# Patient Record
Sex: MALE | Employment: FULL TIME | URBAN - METROPOLITAN AREA
[De-identification: names, ages, dates, MRNs, and addresses within clinical notes are randomized per-mention and may not be internally consistent; named-entity substitution may affect disease eponyms.]

---

## 2019-05-30 ENCOUNTER — OFFICE VISIT (OUTPATIENT)
Dept: URGENT CARE | Facility: URGENT CARE | Age: 49
End: 2019-05-30
Payer: COMMERCIAL

## 2019-05-30 VITALS
HEART RATE: 73 BPM | SYSTOLIC BLOOD PRESSURE: 166 MMHG | DIASTOLIC BLOOD PRESSURE: 81 MMHG | TEMPERATURE: 97.7 F | WEIGHT: 211.6 LBS | OXYGEN SATURATION: 97 %

## 2019-05-30 DIAGNOSIS — D36.7 CYST, DERMOID, ARM, LEFT: Primary | ICD-10-CM

## 2019-05-30 PROCEDURE — 99202 OFFICE O/P NEW SF 15 MIN: CPT | Performed by: PHYSICIAN ASSISTANT

## 2019-05-30 ASSESSMENT — ENCOUNTER SYMPTOMS
CONSTITUTIONAL NEGATIVE: 1
CARDIOVASCULAR NEGATIVE: 1
PALPITATIONS: 0
NEUROLOGICAL NEGATIVE: 1
LIGHT-HEADEDNESS: 0
FEVER: 0
WEAKNESS: 0
CHEST TIGHTNESS: 0
ENDOCRINE NEGATIVE: 1
RHINORRHEA: 0
WHEEZING: 0
MYALGIAS: 0
COUGH: 0
EYE REDNESS: 0
DIAPHORESIS: 0
DYSURIA: 0
DIZZINESS: 0
NAUSEA: 0
MUSCULOSKELETAL NEGATIVE: 1
HEMATURIA: 0
ABDOMINAL PAIN: 0
ADENOPATHY: 0
FREQUENCY: 0
EYE DISCHARGE: 0
POLYDIPSIA: 0
EYE ITCHING: 0
SHORTNESS OF BREATH: 0
VOMITING: 0
GASTROINTESTINAL NEGATIVE: 1
HEADACHES: 0
CHILLS: 0
DIARRHEA: 0
EYES NEGATIVE: 1
RESPIRATORY NEGATIVE: 1
SORE THROAT: 0

## 2019-05-30 NOTE — PROGRESS NOTES
Chief Complaint:    Chief Complaint   Patient presents with     Mass     Patient complains of lump on left bicup        HPI: Wilber Hamm is an 48 year old male who presents for evaluation and treatment of lump on his L bicep.  Patient noticed this yesterday.  The lump has not gotten larger.  It is not painful.  He has not had any injury to the area.      Patient is new to [place].    ROS:      Review of Systems   Constitutional: Negative.  Negative for chills, diaphoresis and fever.   HENT: Negative.  Negative for congestion, ear pain, rhinorrhea and sore throat.    Eyes: Negative.  Negative for discharge, redness and itching.   Respiratory: Negative.  Negative for cough, chest tightness, shortness of breath and wheezing.    Cardiovascular: Negative.  Negative for chest pain and palpitations.   Gastrointestinal: Negative.  Negative for abdominal pain, diarrhea, nausea and vomiting.   Endocrine: Negative.  Negative for polydipsia and polyuria.   Genitourinary: Negative for dysuria, frequency, hematuria and urgency.   Musculoskeletal: Negative.  Negative for myalgias.   Skin: Negative for rash.        Pos for lump on L bicep   Allergic/Immunologic: Negative for immunocompromised state.   Neurological: Negative.  Negative for dizziness, weakness, light-headedness and headaches.   Hematological: Negative for adenopathy.        No pertinent family or medical Hx at this time.  Patient has never smoked.  No pertinent surgical Hx at this time.    Family History   No family history on file.    Social History  Social History     Socioeconomic History     Marital status: Single     Spouse name: Not on file     Number of children: Not on file     Years of education: Not on file     Highest education level: Not on file   Occupational History     Not on file   Social Needs     Financial resource strain: Not on file     Food insecurity:     Worry: Not on file     Inability: Not on file     Transportation needs:     Medical: Not  on file     Non-medical: Not on file   Tobacco Use     Smoking status: Never Smoker     Smokeless tobacco: Never Used   Substance and Sexual Activity     Alcohol use: Not on file     Drug use: Not on file     Sexual activity: Not on file   Lifestyle     Physical activity:     Days per week: Not on file     Minutes per session: Not on file     Stress: Not on file   Relationships     Social connections:     Talks on phone: Not on file     Gets together: Not on file     Attends Jain service: Not on file     Active member of club or organization: Not on file     Attends meetings of clubs or organizations: Not on file     Relationship status: Not on file     Intimate partner violence:     Fear of current or ex partner: Not on file     Emotionally abused: Not on file     Physically abused: Not on file     Forced sexual activity: Not on file   Other Topics Concern     Not on file   Social History Narrative     Not on file        Surgical History:  No past surgical history on file.     Problem List:  There is no problem list on file for this patient.       Allergies:  No Known Allergies     Current Meds:  No current outpatient medications on file.     PHYSICAL EXAM:     Vital signs noted and reviewed by Aldair Mcgrath  /81 (BP Location: Left arm, Patient Position: Chair, Cuff Size: Adult Regular)   Pulse 73   Temp 97.7  F (36.5  C) (Oral)   Wt 96 kg (211 lb 9.6 oz)   SpO2 97%      PEFR:    Physical Exam   Constitutional: He appears well-developed and well-nourished. He is cooperative.  Non-toxic appearance. He does not have a sickly appearance. He does not appear ill. No distress.   HENT:   Head: Normocephalic and atraumatic.   Right Ear: Hearing, tympanic membrane, external ear and ear canal normal. Tympanic membrane is not perforated, not erythematous, not retracted and not bulging.   Left Ear: Hearing, tympanic membrane, external ear and ear canal normal. Tympanic membrane is not perforated, not  erythematous, not retracted and not bulging.   Nose: Nose normal. No mucosal edema or rhinorrhea.   Mouth/Throat: Oropharynx is clear and moist and mucous membranes are normal. No oropharyngeal exudate, posterior oropharyngeal edema, posterior oropharyngeal erythema or tonsillar abscesses. Tonsils are 0 on the right. Tonsils are 0 on the left. No tonsillar exudate.   Eyes: Pupils are equal, round, and reactive to light. EOM are normal.   Neck: Normal range of motion. Neck supple.   Cardiovascular: Normal rate, regular rhythm, S1 normal, S2 normal, normal heart sounds and intact distal pulses. Exam reveals no gallop, no distant heart sounds and no friction rub.   No murmur heard.  Pulmonary/Chest: Effort normal and breath sounds normal. No respiratory distress. He has no decreased breath sounds. He has no wheezes. He has no rhonchi. He has no rales.   Abdominal: Soft. Bowel sounds are normal. He exhibits no distension. There is no tenderness.   Lymphadenopathy:     He has no cervical adenopathy.   Neurological: He is alert. No cranial nerve deficit.   Skin: Skin is warm and dry. No rash noted. He is not diaphoretic.        Roughly pea sized moveable area of firmness on the L bicep.  No swelling, or discoloration.   Psychiatric: He has a normal mood and affect. His speech is normal and behavior is normal. Judgment and thought content normal. Cognition and memory are normal. He is attentive.   Nursing note and vitals reviewed.       Labs:     No results found for this or any previous visit.    Medical Decision Making:    Differential Diagnosis:  Cyst, foreign body     ASSESSMENT:     1. Cyst, dermoid, arm, left           PLAN:     Patient instructed to continue to monitor this.  He will follow up with his PCP if any changes occur.  Worrisome symptoms discussed with instructions to go to the ED.  Patient verbalized understanding and agreed with this plan.     Aldair Mcgrath  5/30/2019, 11:43 AM

## 2020-04-18 ENCOUNTER — VIRTUAL VISIT (OUTPATIENT)
Dept: URGENT CARE | Facility: CLINIC | Age: 50
End: 2020-04-18
Payer: COMMERCIAL

## 2020-04-18 ENCOUNTER — NURSE TRIAGE (OUTPATIENT)
Dept: NURSING | Facility: CLINIC | Age: 50
End: 2020-04-18

## 2020-04-18 DIAGNOSIS — R10.13 DYSPEPSIA: Primary | ICD-10-CM

## 2020-04-18 PROCEDURE — 99213 OFFICE O/P EST LOW 20 MIN: CPT | Mod: 95 | Performed by: INTERNAL MEDICINE

## 2020-04-18 RX ORDER — HYDROXYZINE HYDROCHLORIDE 25 MG/1
25 TABLET, FILM COATED ORAL 3 TIMES DAILY PRN
COMMUNITY
End: 2020-07-14

## 2020-04-18 NOTE — PATIENT INSTRUCTIONS
Sg Merino,     Thanks for speaking with us today. We hope you feel better soon. As discussed, these are the things you should do following the visit:      Use Pepto Bismol as needed for your symptoms    Eat bland foods for the next few days    Keep well hydrated    If the above measures don't work, consider buying other over the counter medications from a drugstore for stomach discomfort, such as famotidine    If your symptoms haven't improved by middle of next week, seek in-person care at an urgent care    We would advise you to set up care with a primary care provider once we are able to go back to in person visits.    Thank you,    Kathrin  Medical Student

## 2020-04-18 NOTE — PROGRESS NOTES
"Wilber Hamm is a 49 year old male who is being evaluated via a billable telephone visit.      The patient has been notified of following:     \"This telephone visit will be conducted via a call between you and your physician/provider. We have found that certain health care needs can be provided without the need for a physical exam.  This service lets us provide the care you need with a short phone conversation.  If a prescription is necessary we can send it directly to your pharmacy.  If lab work is needed we can place an order for that and you can then stop by our lab to have the test done at a later time.    Telephone visits are billed at different rates depending on your insurance coverage. During this emergency period, for some insurers they may be billed the same as an in-person visit.  Please reach out to your insurance provider with any questions.    If during the course of the call the physician/provider feels a telephone visit is not appropriate, you will not be charged for this service.\"    Patient has given verbal consent for Telephone visit?  Yes    How would you like to obtain your AVS? Cornelius Prajapati     Wilber Hamm is a 49 year old male who presents to clinic today for the following health issues:  \"abdominal pain\"     On Wednesday and Thursday, he started feeling more run down, and possibly febrile (no thermometer). He went for a jog last night and was feeling better, \"back to himself.\" He woke up this morning at 5:30am with a knot in his stomach, \"like it was turning from eating something bad.\" It has never been sharply painful, more uncomfortable. At its worst, it was a 5/10. It is not constant. It comes and goes every 30-45 minutes and lasts for a few seconds. The pain is located in the upper stomach.  He took Pepto Bismol which helped him slightly, but not much. After a nap, it felt a little better. He's feeling a bit better this afternoon. Nothing has made the pain worse. He is burping a " lot with a bad taste. He has slight nausea. He has not vomited. He has had no diarrhea. He last had a bowel movement that was normal a couple hours ago. He urinated every 40 minutes this morning. He felt like his mouth was a little dry this morning. He started doing ab workouts in the past few weeks. In the same timeline, he thinks his stomach sounds have been louder. He hasn't had a change in diet. He had Chipotle for dinner last night. He has not had Chipotle in a few months. He had a cold in late February/early March for a few weeks with lots of coughing.     10-12 years ago he had gastritis, which felt similar. He thinks he was given an antibiotic.     No family history of GI issues.    He works in sales for [employer].    He takes hydroxyzine for anxiety. The last time he took it was 2 months ago.    He does not think he has had any exposure to a suspected COVID-19 patient.    He is not coughing, has no runny nose, no headache, no change to temperature perception, no muscle aches, no anosmia, no change to mucus.       Reviewed and updated as needed this visit by Provider         Review of Systems   ROS COMP: Constitutional, HEENT, cardiovascular, pulmonary, gi and gu systems are negative, except as otherwise noted.       Objective   Reported vitals:  There were no vitals taken for this visit.   healthy, alert and no distress  PSYCH: Alert and oriented times 3; coherent speech, normal   rate and volume, able to articulate logical thoughts, able   to abstract reason, no tangential thoughts, no hallucinations   or delusions  His affect is normal  RESP: No cough, no audible wheezing, able to talk in full sentences  Remainder of exam unable to be completed due to telephone visits    Diagnostic Test Results:  Labs reviewed in Epic        Assessment/Plan:  1. Dyspepsia  Likely acute gastritis, possibly viral. Other possibilities include biliary colic, peptic ulcer disease, lactose intolerance. Possibly precipitated by  large meal of Chipotle last night.  Will treat with supportive measures such as Pepto Bismol, bland diet, hydration, OTC medications as necessary (I.e., famotidine).   Discussed following up in person if symptoms haven't resolved by middle of the upcoming week.  Discussed primary care should be established.      No follow-ups on file.      Phone call duration:  43 minutes    Kathrin Oconnor, MS3

## 2020-04-18 NOTE — TELEPHONE ENCOUNTER
"Patient telephone [phone]    Patient states has had mid upper abdominal pain \"2 inches above navel\" x 4.5 hours. Woke up with 7/10 constant pain.  States now the pain comes and goes with a baseline pain 2/10 and then exacerbates periodically.  Slight relief after trying Pepto bismol 2 hours ago.  States 3 days ago had low grade fever and increased fatigue.   Improved by report until early this morning.  No cough. No shortness of breath.  History of gastritis 10 years ago.    Currently afebrile.   Has slight nausea. No vomiting.   States had bowel movement as usual today.  Voiding as usual.    Protocol-  Abdominal Pain-A  Care advice reviewed.   Disposition-  See below per [id number]  Transferred to  for virtual Urgent Care visit today.  Caller states understanding of the recommended disposition.   Advised to call back if further questions or concerns.     BRITNI EvansN RN  [facility]     COVID 19 Nurse Triage Plan/Patient Instructions    Please be aware that novel coronavirus (COVID-19) may be circulating in the community. If you develop symptoms such as fever, cough, or SOB or if you have concerns about the presence of another infection including coronavirus (COVID-19), please contact your health care provider or visit [email or web address].     Disposition/Instructions  Patient to have an Urgent Care Telephone Visit with a provider. Follow System Ambulatory Workflow for COVID 19.     Urgent Care Telephone Visits are available between the hours of 8 am to 9 pm. Staff will assist patent in scheduling an appointment for this Urgent Care Telephone Visit.     Call Back If: Your symptoms worsen before you are able to complete your Urgent Care Telephone Visit with a provider.    Thank you for limiting contact with others, wearing a simple mask to cover your cough, practice good hand hygiene habits and accessing our virtual services where possible to limit the spread of this " "virus.    For more information about COVID19 and options for caring for yourself at home, please visit the CDC website at https://www.cdc.gov/coronavirus/2019-ncov/about/steps-when-sick.html  For more options for care at [facility], please visit our website at [email or web address]    For more information, please use the [place] Department of Health ([facility]) COVID-19 Hotlines (Interpreters available):     Health questions: Phone Number: [phone] or [phone] and Hours: 7 a.m. to 7 p.m.    Schools and  questions: Phone Number: [phone] or [phone] and Hours 7 a.m. to 7 p.m.  Reason for Disposition    [1] MILD-MODERATE pain AND [2] constant AND [3] present > 2 hours    Additional Information    Negative: Shock suspected (e.g., cold/pale/clammy skin, too weak to stand, low BP, rapid pulse)    Negative: Difficult to awaken or acting confused (e.g., disoriented, slurred speech)    Negative: Passed out (i.e., lost consciousness, collapsed and was not responding)    Negative: Sounds like a life-threatening emergency to the triager    Negative: [1] SEVERE pain (e.g., excruciating) AND [2] present > 1 hour    Negative: [1] SEVERE pain AND [2] age > 60    Negative: [1] Vomiting AND [2] contains red blood or black (\"coffee ground\") material  (Exception: few red streaks in vomit that only happened once)    Negative: Blood in bowel movements  (Exception: Blood on surface of BM with constipation)    Negative: Black or tarry bowel movements  (Exception: chronic-unchanged  black-grey bowel movements AND is taking iron pills or Pepto-bismol)    Negative: [1] Unable to urinate (or only a few drops) > 4 hours AND [2] bladder feels very full (e.g., palpable bladder or strong urge to urinate)    Negative: [1] Pain in the scrotum or testicle AND [2] present > 1 hour    Negative: Patient sounds very sick or weak to the triager    Protocols used: ABDOMINAL PAIN - MALE-A-AH    "

## 2020-04-19 ENCOUNTER — OFFICE VISIT (OUTPATIENT)
Dept: URGENT CARE | Facility: URGENT CARE | Age: 50
End: 2020-04-19
Payer: COMMERCIAL

## 2020-04-19 VITALS
WEIGHT: 208.2 LBS | TEMPERATURE: 98.5 F | HEART RATE: 83 BPM | DIASTOLIC BLOOD PRESSURE: 91 MMHG | SYSTOLIC BLOOD PRESSURE: 149 MMHG | OXYGEN SATURATION: 98 %

## 2020-04-19 DIAGNOSIS — R10.13 EPIGASTRIC PAIN: ICD-10-CM

## 2020-04-19 DIAGNOSIS — R10.13 DYSPEPSIA: Primary | ICD-10-CM

## 2020-04-19 LAB
ALBUMIN UR-MCNC: NEGATIVE MG/DL
APPEARANCE UR: CLEAR
BASOPHILS # BLD AUTO: 0 10E9/L (ref 0–0.2)
BASOPHILS NFR BLD AUTO: 0.1 %
BILIRUB UR QL STRIP: NEGATIVE
COLOR UR AUTO: YELLOW
DIFFERENTIAL METHOD BLD: NORMAL
EOSINOPHIL # BLD AUTO: 0.1 10E9/L (ref 0–0.7)
EOSINOPHIL NFR BLD AUTO: 1.1 %
ERYTHROCYTE [DISTWIDTH] IN BLOOD BY AUTOMATED COUNT: 11.9 % (ref 10–15)
GLUCOSE UR STRIP-MCNC: NEGATIVE MG/DL
HCT VFR BLD AUTO: 44.8 % (ref 40–53)
HGB BLD-MCNC: 15.1 G/DL (ref 13.3–17.7)
HGB UR QL STRIP: NEGATIVE
KETONES UR STRIP-MCNC: NEGATIVE MG/DL
LEUKOCYTE ESTERASE UR QL STRIP: NEGATIVE
LYMPHOCYTES # BLD AUTO: 1.2 10E9/L (ref 0.8–5.3)
LYMPHOCYTES NFR BLD AUTO: 16.4 %
MCH RBC QN AUTO: 29.7 PG (ref 26.5–33)
MCHC RBC AUTO-ENTMCNC: 33.7 G/DL (ref 31.5–36.5)
MCV RBC AUTO: 88 FL (ref 78–100)
MONOCYTES # BLD AUTO: 0.6 10E9/L (ref 0–1.3)
MONOCYTES NFR BLD AUTO: 9.1 %
NEUTROPHILS # BLD AUTO: 5.1 10E9/L (ref 1.6–8.3)
NEUTROPHILS NFR BLD AUTO: 73.3 %
NITRATE UR QL: NEGATIVE
PH UR STRIP: 5.5 PH (ref 5–7)
PLATELET # BLD AUTO: 224 10E9/L (ref 150–450)
RBC # BLD AUTO: 5.09 10E12/L (ref 4.4–5.9)
SOURCE: NORMAL
SP GR UR STRIP: >1.03 (ref 1–1.03)
UROBILINOGEN UR STRIP-ACNC: 0.2 EU/DL (ref 0.2–1)
WBC # BLD AUTO: 7 10E9/L (ref 4–11)

## 2020-04-19 PROCEDURE — 85025 COMPLETE CBC W/AUTO DIFF WBC: CPT | Performed by: PHYSICIAN ASSISTANT

## 2020-04-19 PROCEDURE — 81003 URINALYSIS AUTO W/O SCOPE: CPT | Performed by: PHYSICIAN ASSISTANT

## 2020-04-19 PROCEDURE — 36415 COLL VENOUS BLD VENIPUNCTURE: CPT | Performed by: PHYSICIAN ASSISTANT

## 2020-04-19 PROCEDURE — 80053 COMPREHEN METABOLIC PANEL: CPT | Performed by: PHYSICIAN ASSISTANT

## 2020-04-19 PROCEDURE — 83690 ASSAY OF LIPASE: CPT | Performed by: PHYSICIAN ASSISTANT

## 2020-04-19 PROCEDURE — 99214 OFFICE O/P EST MOD 30 MIN: CPT | Performed by: PHYSICIAN ASSISTANT

## 2020-04-19 RX ORDER — OMEPRAZOLE 20 MG/1
20 TABLET, DELAYED RELEASE ORAL DAILY
Qty: 30 TABLET | Refills: 0 | Status: SHIPPED | OUTPATIENT
Start: 2020-04-19 | End: 2020-05-11

## 2020-04-19 NOTE — LETTER
April 20, 2020      Wilber Hamm  [address] [place] [address]  [place] [address]      Dear ,    The results of your recent lab tests were within normal limits. Enclosed is a copy of these results.  If you have any further questions or problems, please contact our office at [phone].    Sincerely,      Heather Delacruz PA-C    Resulted Orders   *UA reflex to Microscopic and Culture ([place] and [facility] (except [place] and [place])   Result Value Ref Range    Color Urine Yellow     Appearance Urine Clear     Glucose Urine Negative NEG^Negative mg/dL    Bilirubin Urine Negative NEG^Negative    Ketones Urine Negative NEG^Negative mg/dL    Specific Gravity Urine >1.030 1.003 - 1.035    Blood Urine Negative NEG^Negative    pH Urine 5.5 5.0 - 7.0 pH    Protein Albumin Urine Negative NEG^Negative mg/dL    Urobilinogen Urine 0.2 0.2 - 1.0 EU/dL    Nitrite Urine Negative NEG^Negative    Leukocyte Esterase Urine Negative NEG^Negative    Source Midstream Urine    CBC with platelets and differential   Result Value Ref Range    WBC 7.0 4.0 - 11.0 10e9/L    RBC Count 5.09 4.4 - 5.9 10e12/L    Hemoglobin 15.1 13.3 - 17.7 g/dL    Hematocrit 44.8 40.0 - 53.0 %    MCV 88 78 - 100 fl    MCH 29.7 26.5 - 33.0 pg    MCHC 33.7 31.5 - 36.5 g/dL    RDW 11.9 10.0 - 15.0 %    Platelet Count 224 150 - 450 10e9/L    % Neutrophils 73.3 %    % Lymphocytes 16.4 %    % Monocytes 9.1 %    % Eosinophils 1.1 %    % Basophils 0.1 %    Absolute Neutrophil 5.1 1.6 - 8.3 10e9/L    Absolute Lymphocytes 1.2 0.8 - 5.3 10e9/L    Absolute Monocytes 0.6 0.0 - 1.3 10e9/L    Absolute Eosinophils 0.1 0.0 - 0.7 10e9/L    Absolute Basophils 0.0 0.0 - 0.2 10e9/L    Diff Method Automated Method    Comprehensive metabolic panel (BMP + Alb, Alk Phos, ALT, AST, Total. Bili, TP)   Result Value Ref Range    Sodium 138 133 - 144 mmol/L    Potassium 4.2 3.4 - 5.3 mmol/L    Chloride 105 94 - 109 mmol/L    Carbon Dioxide 29 20 - 32 mmol/L    Anion Gap  4 3 - 14 mmol/L    Glucose 84 70 - 99 mg/dL      Comment:      Non Fasting    Urea Nitrogen 17 7 - 30 mg/dL    Creatinine 0.92 0.66 - 1.25 mg/dL    GFR Estimate >90 >60 mL/min/[1.73_m2]      Comment:      Non  GFR Calc  Starting 12/18/2018, serum creatinine based estimated GFR (eGFR) will be   calculated using the Chronic Kidney Disease Epidemiology Collaboration   (CKD-EPI) equation.      GFR Estimate If Black >90 >60 mL/min/[1.73_m2]      Comment:       GFR Calc  Starting 12/18/2018, serum creatinine based estimated GFR (eGFR) will be   calculated using the Chronic Kidney Disease Epidemiology Collaboration   (CKD-EPI) equation.      Calcium 9.4 8.5 - 10.1 mg/dL    Bilirubin Total 0.7 0.2 - 1.3 mg/dL    Albumin 3.9 3.4 - 5.0 g/dL    Protein Total 7.4 6.8 - 8.8 g/dL    Alkaline Phosphatase 74 40 - 150 U/L    ALT 21 0 - 70 U/L    AST 20 0 - 45 U/L   Lipase   Result Value Ref Range    Lipase 77 73 - 393 U/L

## 2020-04-19 NOTE — PROGRESS NOTES
"S: 48 yo male presents for  epigastric pain x 2 days. Had Chipotle delivered the night before.  Run down feeling.  However has noted increased abdominal gurgling sounds for 2 weeks. Nauseated. No dizziness. Pain not worse after eating. Yesterday had urinary frequency, none now. Some left sided thoracic discomfort. Pepto Bismol, minimal relief. No HA or myalgia. No CP or SHORTNESS OF BREATH. No vomiting or diarrhea. Burps have an acid taste to them.      As per virtual UC visit yesterday:    On Wednesday, 4/15 and Thursday, 4/16 he started feeling more run down, and possibly febrile (no thermometer). He went for a jog night of 4/17 and was feeling better, \"back to himself.\" He woke up this morning at 5:30am with a knot in his stomach, \"like it was turning from eating something bad.\" It has never been sharply painful, more uncomfortable. At its worst, it was a 5/10. It is not constant. It comes and goes every 30-45 minutes and lasts for a few seconds. The pain is located in the upper stomach.  He took Pepto Bismol which helped him slightly, but not much. After a nap, it felt a little better. He's feeling a bit better this afternoon. Nothing has made the pain worse. He is burping a lot with a bad taste. He has slight nausea. He has not vomited. He has had no diarrhea. He last had a bowel movement that was normal a couple hours ago. He urinated every 40 minutes 4/18 in the morning. He felt like his mouth was a little dry this morning. He started doing ab workouts in the past few weeks. In the same timeline, he thinks his stomach sounds have been louder. He hasn't had a change in diet. He had Chipotle for dinner last night. He has not had Chipotle in a few months. He had a cold in late February/early March for a few weeks with lots of coughing.       No Known Allergies    PMHX-H/O urethritis. Neg GC/CHlamydia 12/21/2018  FMHX- no gallstones    hydrOXYzine (ATARAX) 25 MG tablet, Take 25 mg by mouth 3 times daily as " needed    No current facility-administered medications on file prior to visit.       Social History     Tobacco Use     Smoking status: Never Smoker     Smokeless tobacco: Never Used   Substance Use Topics     Alcohol use: Not on file   No alcohol intake    ROS:  CONSTITUTIONAL: Negative for fatigue or fever.  EYES: Negative for eye problems.  ENT: neg for ST.  RESP: neg for cough.  CV: Negative for chest pains.  GI: Negative for vomiting.  MUSCULOSKELETAL:  Negative for significant muscle or joint pains.  NEUROLOGIC: Negative for headaches.  SKIN: Negative for rash.  PSYCH: Normal mentation for age.    OBJECTIVE:  BP (!) 149/91 (BP Location: Left arm, Patient Position: Chair, Cuff Size: Adult Regular)   Pulse 83   Temp 98.5  F (36.9  C) (Oral)   Wt 94.4 kg (208 lb 3.2 oz)   SpO2 98%     GENERAL APPEARANCE: Healthy, alert and no distress.  EYES:Conjunctiva/sclera clear.  EARS: No cerumen.   Ear canals w/o erythema.  TM's intact w/o erythema.    NOSE/MOUTH: Nose without ulcers, erythema or lesions.  SINUSES: No maxillary sinus tenderness.  THROAT: No erythema w/o tonsillar enlargement . No exudates.  NECK: Supple, nontender, no lymphadenopathy.  RESP: Lungs clear to auscultation - no rales, rhonchi or wheezes  CV: Regular rate and rhythm, normal S1 S2, no murmur noted.  NEURO: Awake, alert    SKIN: No rashes  Abd- soft , mild epigastric tenderness. Mildly hyperactive bowel sounds. No HSM. No rigidity, guarding or rebound.  Results for orders placed or performed in visit on 04/19/20   *UA reflex to Microscopic and Culture ([facility] (except [place] and [place])     Status: None    Specimen: Midstream Urine   Result Value Ref Range    Color Urine Yellow     Appearance Urine Clear     Glucose Urine Negative NEG^Negative mg/dL    Bilirubin Urine Negative NEG^Negative    Ketones Urine Negative NEG^Negative mg/dL    Specific Gravity Urine >1.030 1.003 - 1.035    Blood Urine Negative NEG^Negative     pH Urine 5.5 5.0 - 7.0 pH    Protein Albumin Urine Negative NEG^Negative mg/dL    Urobilinogen Urine 0.2 0.2 - 1.0 EU/dL    Nitrite Urine Negative NEG^Negative    Leukocyte Esterase Urine Negative NEG^Negative    Source Midstream Urine    CBC with platelets and differential     Status: None   Result Value Ref Range    WBC 7.0 4.0 - 11.0 10e9/L    RBC Count 5.09 4.4 - 5.9 10e12/L    Hemoglobin 15.1 13.3 - 17.7 g/dL    Hematocrit 44.8 40.0 - 53.0 %    MCV 88 78 - 100 fl    MCH 29.7 26.5 - 33.0 pg    MCHC 33.7 31.5 - 36.5 g/dL    RDW 11.9 10.0 - 15.0 %    Platelet Count 224 150 - 450 10e9/L    % Neutrophils 73.3 %    % Lymphocytes 16.4 %    % Monocytes 9.1 %    % Eosinophils 1.1 %    % Basophils 0.1 %    Absolute Neutrophil 5.1 1.6 - 8.3 10e9/L    Absolute Lymphocytes 1.2 0.8 - 5.3 10e9/L    Absolute Monocytes 0.6 0.0 - 1.3 10e9/L    Absolute Eosinophils 0.1 0.0 - 0.7 10e9/L    Absolute Basophils 0.0 0.0 - 0.2 10e9/L    Diff Method Automated Method        ASSESSMENT:     ICD-10-CM    1. Dyspepsia  R10.13 *UA reflex to Microscopic and Culture ([facility] [place] (except [place] and [place])     CBC with platelets and differential     Comprehensive metabolic panel (BMP + Alb, Alk Phos, ALT, AST, Total. Bili, TP)     Lipase     lidocaine (XYLOCAINE) 2 % 15 mL, alum & mag hydroxide-simethicone (MAALOX  ES) 15 mL GI Cocktail     omeprazole (PRILOSEC OTC) 20 MG EC tablet   2. Epigastric pain  R10.13 *UA reflex to Microscopic and Culture ([facility] [place] (except [place] and [place])     CBC with platelets and differential     Comprehensive metabolic panel (BMP + Alb, Alk Phos, ALT, AST, Total. Bili, TP)     Lipase     lidocaine (XYLOCAINE) 2 % 15 mL, alum & mag hydroxide-simethicone (MAALOX  ES) 15 mL GI Cocktail     omeprazole (PRILOSEC OTC) 20 MG EC tablet           PLAN:Can't say yet if GI cocktail helped- only 20 minutes. Sxs likely due to gastritis or GERD. Start Prilosec. F/U PCP 1 week prn.  Sooner as needed.  I have discussed clinical findings with patient.  Side effects of medications discussed.  Symptomatic care is discussed.  I have discussed the possibility of  worsening symptoms and indication to RTC or go to the ER if they occur.  All questions are answered, patient indicates understanding of these issues and is in agreement with plan.   Patient care instructions are discussed/given at the end of visit.   Lots of rest and fluids.    Heather Delacruz PA-C

## 2020-04-20 LAB
ALBUMIN SERPL-MCNC: 3.9 G/DL (ref 3.4–5)
ALP SERPL-CCNC: 74 U/L (ref 40–150)
ALT SERPL W P-5'-P-CCNC: 21 U/L (ref 0–70)
ANION GAP SERPL CALCULATED.3IONS-SCNC: 4 MMOL/L (ref 3–14)
AST SERPL W P-5'-P-CCNC: 20 U/L (ref 0–45)
BILIRUB SERPL-MCNC: 0.7 MG/DL (ref 0.2–1.3)
BUN SERPL-MCNC: 17 MG/DL (ref 7–30)
CALCIUM SERPL-MCNC: 9.4 MG/DL (ref 8.5–10.1)
CHLORIDE SERPL-SCNC: 105 MMOL/L (ref 94–109)
CO2 SERPL-SCNC: 29 MMOL/L (ref 20–32)
CREAT SERPL-MCNC: 0.92 MG/DL (ref 0.66–1.25)
GFR SERPL CREATININE-BSD FRML MDRD: >90 ML/MIN/{1.73_M2}
GLUCOSE SERPL-MCNC: 84 MG/DL (ref 70–99)
LIPASE SERPL-CCNC: 77 U/L (ref 73–393)
POTASSIUM SERPL-SCNC: 4.2 MMOL/L (ref 3.4–5.3)
PROT SERPL-MCNC: 7.4 G/DL (ref 6.8–8.8)
SODIUM SERPL-SCNC: 138 MMOL/L (ref 133–144)

## 2020-04-25 NOTE — PROGRESS NOTES
I was present during this telephone visit with the medical student who participated in the service and in the documentation of the note. I have verified the history and personally performed the physical exam and medical decision making. I agree with the assessment and plan of care as documented in the note with the following additions:       I personally spent a total of 10 minutes speaking with Wilber Hamm during today s visit.     Dave Castro MD  9:22 AM, April 25, 2020

## 2020-05-11 ENCOUNTER — VIRTUAL VISIT (OUTPATIENT)
Dept: FAMILY MEDICINE | Facility: CLINIC | Age: 50
End: 2020-05-11
Payer: COMMERCIAL

## 2020-05-11 DIAGNOSIS — K29.00 ACUTE GASTRITIS WITHOUT HEMORRHAGE, UNSPECIFIED GASTRITIS TYPE: ICD-10-CM

## 2020-05-11 DIAGNOSIS — R10.13 EPIGASTRIC PAIN: Primary | ICD-10-CM

## 2020-05-11 PROCEDURE — 99214 OFFICE O/P EST MOD 30 MIN: CPT | Mod: 95 | Performed by: PHYSICIAN ASSISTANT

## 2020-05-11 ASSESSMENT — PAIN SCALES - GENERAL: PAINLEVEL: MILD PAIN (2)

## 2020-05-11 NOTE — PROGRESS NOTES
Patient needs to schedule lab only appointment.    Please call patient with instructions     Thank you   Sonja REED

## 2020-05-11 NOTE — PROGRESS NOTES
"Wilber Hamm is a 49 year old male who is being evaluated via a billable telephone visit.      The patient has been notified of following:     \"This telephone visit will be conducted via a call between you and your physician/provider. We have found that certain health care needs can be provided without the need for a physical exam.  This service lets us provide the care you need with a short phone conversation.  If a prescription is necessary we can send it directly to your pharmacy.  If lab work is needed we can place an order for that and you can then stop by our lab to have the test done at a later time.    Telephone visits are billed at different rates depending on your insurance coverage. During this emergency period, for some insurers they may be billed the same as an in-person visit.  Please reach out to your insurance provider with any questions.    If during the course of the call the physician/provider feels a telephone visit is not appropriate, you will not be charged for this service.\"    Patient has given verbal consent for Telephone visit?  Yes    What phone number would you like to be contacted at? [phone]    How would you like to obtain your AVS? Mail a copy    Alo Hamm is a 49 year old male who presents to clinic today for the following health issues:    HPI  Gastrointestinal symptoms      Duration: 1 month     Description:           REFLUX SYMPTOMS - heartburn, acid taste in mouth and epigastric taste      Intensity:  moderate    Accompanying signs and symptoms:  none    History  Previous {similar problem: YES, had gastritis 12-15 years ago. Reports that he has to take antibiotics to clear it up.  Previous evaluation:  none    Aggravating factors: coffee    Alleviating factors: proton pump inhibitor - omeprazole helps, but patient did not take it daily for the last week when symptoms resurfaced.     Other Therapies tried: None     omeprazole helps, but not completely "         There is no problem list on file for this patient.    History reviewed. No pertinent surgical history.    Social History     Tobacco Use     Smoking status: Never Smoker     Smokeless tobacco: Never Used   Substance Use Topics     Alcohol use: Not Currently     Family History   Family history unknown: Yes         Current Outpatient Medications   Medication Sig Dispense Refill     omeprazole (PRILOSEC) 20 MG DR capsule Take 1 capsule (20 mg) by mouth daily 90 capsule 1     hydrOXYzine (ATARAX) 25 MG tablet Take 25 mg by mouth 3 times daily as needed       No Known Allergies    Reviewed and updated as needed this visit by Provider  Tobacco  Allergies  Meds  Problems  Med Hx  Surg Hx  Fam Hx       Review of Systems   Constitutional, HEENT, cardiovascular, pulmonary, gi and gu systems are negative, except as otherwise noted.       Objective   Reported vitals:  There were no vitals taken for this visit.   healthy, alert and no distress  PSYCH: Alert and oriented times 3; coherent speech, normal   rate and volume, able to articulate logical thoughts, able   to abstract reason, no tangential thoughts, no hallucinations   or delusions  His affect is normal  RESP: No cough, no audible wheezing, able to talk in full sentences  Remainder of exam unable to be completed due to telephone visits    Diagnostic Test Results:  Labs reviewed in Epic        Assessment/Plan:  1. Epigastric pain    - Helicobacter pylori Antigen Stool; Future  - omeprazole (PRILOSEC) 20 MG DR capsule; Take 1 capsule (20 mg) by mouth daily  Dispense: 90 capsule; Refill: 1    2. Acute gastritis without hemorrhage, unspecified gastritis type  Restart Omeprazole 20 mg every am on empty stomach 30 minutes before breakfast. Avoid coffee. Get H.pyori test done. May need to take Omeprazole daily for 3-6 months to allow proper gastric mucosa healing.   - omeprazole (PRILOSEC) 20 MG DR capsule; Take 1 capsule (20 mg) by mouth daily  Dispense: 90  capsule; Refill: 1    Return in about 3 days (around 5/14/2020) for Lab Work.      Phone call duration:  16 minutes    Crystal Donato PA-C

## 2020-05-14 DIAGNOSIS — R10.13 EPIGASTRIC PAIN: ICD-10-CM

## 2020-05-14 PROCEDURE — 87338 HPYLORI STOOL AG IA: CPT | Performed by: PHYSICIAN ASSISTANT

## 2020-05-15 LAB — H PYLORI AG STL QL IA: NEGATIVE

## 2020-05-16 ENCOUNTER — NURSE TRIAGE (OUTPATIENT)
Dept: NURSING | Facility: CLINIC | Age: 50
End: 2020-05-16

## 2020-05-16 NOTE — TELEPHONE ENCOUNTER
"Patient calling for test results from 5/11, gave per epic.Advised pt to call PCP on Monday 5/18 to discuss next step. Per caller he is still taking Prilosec and still having \"nagging\" upper abdominal pain on and off.\"   Traci Santiago RN [facility]    COVID 19 Nurse Triage Plan/Patient Instructions    Please be aware that novel coronavirus (COVID-19) may be circulating in the community. If you develop symptoms such as fever, cough, or SOB or if you have concerns about the presence of another infection including coronavirus (COVID-19), please contact your health care provider or visit [email or web address].     Disposition/Instructions    Patient to have scheduled Telephone Visit with a provider. Follow System Ambulatory Workflow for COVID 19.     The clinic staff will assist you to schedule an appointment to complete the Telephone Visit with a provider during normal clinic hours.       Call Back If: Your symptoms worsen before you are able to complete your Telephone Visit with a provider.    Thank you for limiting contact with others, wearing a simple mask to cover your cough, practice good hand hygiene habits and accessing our virtual services where possible to limit the spread of this virus.    For more information about COVID19 and options for caring for yourself at home, please visit the CDC website at https://www.cdc.gov/coronavirus/2019-ncov/about/steps-when-sick.html  For more options for care at [facility] [place], please visit our website at [email or web address]    For more information, please use the [place] Department of Health COVID-19 Website: https://www.health.[facility].[place].us/diseases/coronavirus/index.html  [place] Department of Health ([facility]) COVID-19 Hotlines (Interpreters available):      Health questions: Phone Number: [phone] or [phone] and Hours: 7 a.m. to 7 p.m.    Schools and  questions: Phone Number: [phone] or [phone] and " Hours 7 a.m. to 7 p.m.

## 2020-05-19 ENCOUNTER — VIRTUAL VISIT (OUTPATIENT)
Dept: FAMILY MEDICINE | Facility: CLINIC | Age: 50
End: 2020-05-19
Payer: COMMERCIAL

## 2020-05-19 DIAGNOSIS — R10.13 EPIGASTRIC PAIN: Primary | ICD-10-CM

## 2020-05-19 PROCEDURE — 99214 OFFICE O/P EST MOD 30 MIN: CPT | Mod: 95 | Performed by: FAMILY MEDICINE

## 2020-05-19 RX ORDER — CLINDAMYCIN HCL 300 MG
CAPSULE ORAL
COMMUNITY
Start: 2020-05-15 | End: 2020-07-14

## 2020-05-19 RX ORDER — IBUPROFEN 800 MG/1
TABLET, FILM COATED ORAL
COMMUNITY
Start: 2020-05-15 | End: 2020-05-19

## 2020-05-19 RX ORDER — PANTOPRAZOLE SODIUM 40 MG/1
40 TABLET, DELAYED RELEASE ORAL DAILY
Qty: 90 TABLET | Refills: 0 | Status: SHIPPED | OUTPATIENT
Start: 2020-05-19 | End: 2020-07-14

## 2020-05-19 NOTE — PROGRESS NOTES
"Wilber Hamm is a 49 year old male who is being evaluated via a billable telephone visit.      The patient has been notified of following:     \"This telephone visit will be conducted via a call between you and your physician/provider. We have found that certain health care needs can be provided without the need for a physical exam.  This service lets us provide the care you need with a short phone conversation.  If a prescription is necessary we can send it directly to your pharmacy.  If lab work is needed we can place an order for that and you can then stop by our lab to have the test done at a later time.    Telephone visits are billed at different rates depending on your insurance coverage. During this emergency period, for some insurers they may be billed the same as an in-person visit.  Please reach out to your insurance provider with any questions.    If during the course of the call the physician/provider feels a telephone visit is not appropriate, you will not be charged for this service.\"    Patient has given verbal consent for Telephone visit?  Yes    What phone number would you like to be contacted at? [phone]    How would you like to obtain your AVS? Mail a copy    Subjective     Wilber Hamm is a 49 year old male who presents via phone visit today for the following health issues:    HPI  Gastrointestinal symptoms      Duration: 1 month     Description:           REFLUX SYMPTOMS - heartburn, acid taste in mouth and epigastric pain       Intensity:  mild    Accompanying signs and symptoms:  Back pain     History  Previous {similar problem: YES- 12-15 years ago   Previous evaluation:  none    Aggravating factors: caffeine    Alleviating factors: proton pump inhibitor - omeprazole     Other Therapies tried: None    {PEDS Chronic and Acute Problems (Optional):882108}     {additonal problems for provider to add (Optional):696907}    {HIST REVIEW/ LINKS 2 (Optional):242420}    Reviewed and updated as " "needed this visit by Provider         Review of Systems   {ROS COMP (Optional):854901}       Objective   Reported vitals:  There were no vitals taken for this visit.   {GENERAL APPEARANCE:50::\"healthy\",\"alert\",\"no distress\"}  PSYCH: Alert and oriented times 3; coherent speech, normal   rate and volume, able to articulate logical thoughts, able   to abstract reason, no tangential thoughts, no hallucinations   or delusions  His affect is { :4873628::\"normal\"}  RESP: No cough, no audible wheezing, able to talk in full sentences  Remainder of exam unable to be completed due to telephone visits    {Diagnostic Test Results (Optional):118092::\"Diagnostic Test Results:\",\"Labs reviewed in Epic\"}        Assessment/Plan:  {Diagnosis, Associated Orders and Comment:791778}    No follow-ups on file.      Phone call duration:  *** minutes    {signature options:398992}        "

## 2020-05-19 NOTE — PATIENT INSTRUCTIONS
At [facility], we strive to deliver an exceptional experience to you, every time we see you.  If you receive a survey in the mail, please send us back your thoughts. We really do value your feedback.    Based on your medical history, these are the current health maintenance/preventive care services that you are due for (some may have been done at this visit.)  Health Maintenance Due   Topic Date Due     PREVENTIVE CARE VISIT  1972     HIV SCREENING  10/22/1987     DTAP/TDAP/TD IMMUNIZATION (1 - Tdap) 10/22/1997     LIPID  10/22/2007         Suggested websites for health information:  Www.[facility].org : Up to date and easily searchable information on multiple topics.  Www.[place].gov : medication info, interactive tutorials, watch real surgeries online  Www.familydoctor.org : good info from the Academy of Family Physicians  Www.cdc.gov : public health info, travel advisories, epidemics (H1N1)  Www.aap.org : children's health info, normal development, vaccinations  Www.health.[facility].[place].us : [place] dept of health, public health issues in [place], N1N1    Your care team:                            Family Medicine Internal Medicine   MD Nicholas Atkins MD Shantel Branch-Fleming, MD Katya Georgiev PA-C Nam Ho, MD Pediatrics   ROBYN Briseno, SARABJIT Westbrook APRINDIANA CNP   MD Graciela Pelayo MD Deborah Mielke, MD Kim Thein, APRN [facility]      Clinic hours: Monday - Thursday 7 am-7 pm; Fridays 7 am-5 pm.   Urgent care: Monday - Friday 11 am-9 pm; Saturday and Sunday 9 am-5 pm.  Pharmacy : Monday -Thursday 8 am-8 pm; Friday 8 am-6 pm; Saturday and Sunday 9 am-5 pm.     Clinic: [phone]   Pharmacy: [phone]    Patient Education     Epigastric Pain (Uncertain Cause)  Epigastric pain is pain in the upper abdomen. It can be a sign of disease. Common causes include:    Acid reflux (stomach acid flowing up into the esophagus)    Gastritis  (irritation of the stomach lining) Most often this is from aspirin or NSAID medicines such as ibuprofen, bacteria called H. pylori, or frequent alcohol use.    Peptic ulcer disease    Inflammation of the pancreas    Gallstone    Infection in the gallbladder  Pain may be dull or burning. It may spread upward to the chest or to the back. There may be other symptoms such as belching, bloating, cramps or hunger pains. There may be weight loss or poor appetite, nausea or vomiting.  Since the cause of your pain is not certain yet,you may need more tests. Sometimes the doctor will treat you for the most likely condition to see if there is improvement before doing more tests.  Home care  Medicines    Antacids help neutralize the normal acids in your stomach.If you don t like the liquid, you can try a chewable one. You may find one works better than another for you. Overuse can cause diarrhea or constipation.    Acid blockers (H2 blockers) decrease acid production. Examples are cimetidine, famotidine, and ranitidine.    Acid inhibitors (PPIs) decrease acid production in a different way than the blockers. You may find they work better, but can take a little longer to take effect.  Examples are omeprazole, lansoprazole, pantoprazole, rabeprazole, and esomeprazole. Many of these are available over-the-counter or available as generics.    Take an antacid 30 to 60 minutes after eating and at bedtime, but not at the same time as an acid blocker.    Try not to take NSAIDs such as ibuprofen. Aspirin may also cause problems, but if taking it for your heart or other medical reasons, talk to your doctor before stopping it; you don't want to cause a worse problem, like a heart attack or stroke.  Diet    If certain foods seem to cause your pain, try not to eat them. Certain foods can worsen symptoms of gastritis. Limit or avoid fatty, fried, and spicy foods, as well as coffee, chocolate, mint, and foods with high acid content such as  tomatoes and citrus fruit and juices (orange, grapefruit, lemon).    Eat slowly and chew food well before swallowing. Symptoms of gastritis can be worsened by certain foods.    Don't drink alcohol. It can irritate the stomach.    Don't consume caffeine, or use tobacco. These can delay healing and worsen your problem.    Try eating smaller meals with snacks in between.    Keep an empty stomach for 2 to 3 hours before lying down.    Prop the head of the bed up if you have overnight symptoms. This helps acid clear from your esophagus.    Follow-up care  Follow up with your healthcare provider or as advised.  When to seek medical advice  Call your healthcare provider right away if any of the following occur:    Stomach pain worsens or moves to the right lower part of the abdomen    Chest pain appears, or if it worsens or spreads to the chest, back, neck, shoulder, or arm    Frequent vomiting (can t keep down liquids)    Blood in the stool or vomit (red or black color)    Feeling weak or dizzy, fainting, or having trouble breathing    Fever of 100.4 F (38 C) or higher, or as directed by your healthcare provider    Abdominal swelling  Date Last Reviewed: 3/1/2018    1751-4709 [employer]. [address] [place] [address]. All rights reserved. This information is not intended as a substitute for professional medical care. Always follow your healthcare professional's instructions.           Patient Education     What Is GERD?     With GERD, the weak LES allows food and fluids to travel back, or reflux, into the esophagus.      If you often have a painful burning feeling in your chest after you eat, you may have gastroesophageal reflux disease (GERD). Heartburn that keeps coming back is a classic symptom of GERD. But you may have other symptoms as well. A GERD diagnosis is made only after a complete evaluation by your healthcare provider.  Note: Chest pain may also be caused by heart problems. Be sure  to have all chest pain evaluated by a healthcare provider.   When you have a reflux problem  After you eat, food travels from your mouth down the esophagus to your stomach. Along the way, food passes through a one-way valve called the lower esophageal sphincter (LES). The LES sits at the opening to your stomach. Normally the LES opens when you swallow. It lets food enter the stomach, then closes quickly. With GERD, the LES doesn t work normally. It lets food and stomach acid flow back (reflux) into the esophagus.  Some common symptoms    Frequent heartburn or burping    Sour-tasting fluid backing up into your mouth    Symptoms that get worse after you eat, bend over, or lie down    Trouble swallowing or pain when swallowing    A dry, long-term (chronic) cough    Upset stomach (nausea) or vomiting  Relieving your discomfort  You and your healthcare provider can work together to find the treatment options that best ease your symptoms. These may include lifestyle changes, medicine, and possibly surgery.  Many people find their GERD symptoms decrease when they eat small frequent meals instead of 3 large ones. Reducing the amount of fatty foods in your diet will also help.   The following foods tend to cause problems for people diagnosed with GERD:    Tomatoes and tomato products    Alcohol    Coffee    Peppermint    Greasy or spicy foods  Talk with your provider if you don t understand how to make the dietary changes needed to control your GERD symptoms. Your provider can refer you to a nutritionist.  Date Last Reviewed: 7/1/2016 2000-2019 [employer]. [address] [place] [address], [place] [address]. All rights reserved. This information is not intended as a substitute for professional medical care. Always follow your healthcare professional's instructions.           Patient Education     Tips to Control Acid Reflux    To control acid reflux, you ll need to make some basic diet and lifestyle changes. The  simple steps outlined below may be all you ll need to ease discomfort.  Watch what you eat    Avoid fatty foods and spicy foods.    Eat fewer acidic foods, such as citrus and tomato-based foods. These can increase symptoms.    Limit drinking alcohol, caffeine, and fizzy beverages. All increase acid reflux.    Try limiting chocolate, peppermint, and spearmint. These can worsen acid reflux in some people.  Watch when you eat    Avoid lying down for 3 hours after eating.    Do not snack before going to bed.  Raise your head  Raising your head and upper body by 4 to 6 inches helps limit reflux when you re lying down. Put blocks under the head of your bed frame to raise it.  Other changes    Lose weight, if you need to    Don t exercise near bedtime    Avoid tight-fitting clothes    Limit aspirin and ibuprofen    Stop smoking   Date Last Reviewed: 7/1/2016 2000-2019 [employer]. [address] [place] [address], [place] [address]. All rights reserved. This information is not intended as a substitute for professional medical care. Always follow your healthcare professional's instructions.           Patient Education     Lifestyle Changes for Controlling GERD  When you have GERD, stomach acid feels as if it s backing up toward your mouth. Whether or not you take medicine to control your GERD, your symptoms can often be improved with lifestyle changes. Talk to your healthcare provider about the following suggestions. These suggestions may help you get relief from your symptoms.      Raise your head  Reflux is more likely to strike when you re lying down flat, because stomach fluid can flow backward more easily. Raising the head of your bed 4 to 6 inches can help. To do this:    Slide blocks or books under the legs at the head of your bed. Or, place a wedge under the mattress. [employer] can make a suitable wedge for you. The wedge should run from your waist to the top of your head.    Don t just prop your head  on several pillows. This increases pressure on your stomach. It can make GERD worse.  Watch your eating habits  Certain foods may increase the acid in your stomach or relax the lower esophageal sphincter. This makes GERD more likely. It s best to avoid the following if they cause you symptoms:    Coffee, tea, and carbonated drinks (with and without caffeine)    Fatty, fried, or spicy food    Mint, chocolate, onions, and tomatoes    Peppermint    Any other foods that seem to irritate your stomach or cause you pain  Relieve the pressure  Tips include the following:    Eat smaller meals, even if you have to eat more often.    Don t lie down right after you eat. Wait a few hours for your stomach to empty.    Avoid tight belts and tight-fitting clothes.    Lose excess weight.  Tobacco and alcohol  Avoid smoking tobacco and drinking alcohol. They can make GERD symptoms worse.  Date Last Reviewed: 7/1/2016 2000-2019 [employer]. [address] [place] [address]. All rights reserved. This information is not intended as a substitute for professional medical care. Always follow your healthcare professional's instructions.           Patient Education     Clostridium Difficile Infection  Clostridium difficile (C. diff) bacteria can be very harmful. They affect the intestinal tract. They can cause symptoms ranging from mild diarrhea to severe inflammation of the large intestine (colon). C. diff infection is most common during the days and weeks after treatment with antibiotics. Anyone can become infected. But the risk is greatly increased for people in hospitals and for people living in nursing homes or long-term care facilities. This is because antibiotic use is common there. Germs also spread easily in these places.    What causes C. diff infection?  The stomach and intestines have hundreds of kinds of bacteria. Many of these bacteria actually help keep harmful bacteria like C. diff from causing  problems. Small amounts of C. diff are normal in the intestine and don t cause problems. When you take an antibiotic, the normal balance of good and bad bacteria may be affected. There may be too few good bacteria and too many harmful bacteria like C diff. In hospitals and nursing homes, C. diff may be spread from an infected person to others. This can happen when staff or visitors touch infected people or objects such as bed rails, stethoscopes, or bedpans and then touch other people or surfaces.  What are the symptoms of C. diff infection?  About half of people with C. diff infection have no symptoms. Yet they can still pass the infection to others. Others do have symptoms. These include:    Watery diarrhea, which may contain mucus    Pain and cramping    Fever  Some who are infected develop serious problems. Symptoms include:    Belly (abdominal) pain    Abdominal swelling    Nausea and vomiting    Little or no diarrhea  How is C. diff infection diagnosed?  To confirm the infection, a sample of stool is tested for the bacteria or the toxins made by the bacteria.  How is C. diff infection treated?  Your healthcare provider will tell you to stop taking any antibiotics you have been prescribed, based on your healthcare needs. He or she may prescribe different medicines as needed. In certain cases, you may be given an antibiotic directed at the C. diff infection. Talk with your healthcare provider before stopping or starting any medicines.    Fluids are often given by IV (intravenously) through a vein. This helps replace fluids lost through diarrhea.    In rare cases you may need surgery if treatment doesn t cure severe symptoms  To lessen symptoms:    Drink plenty of fluids to replace water lost through diarrhea. Talk with your healthcare provider or nurse about which fluids are best.    Follow your healthcare provider s instructions for when and what to eat.    Unless your healthcare provider tells you to do so,  don't take medicines for diarrhea.    Tell your healthcare provider if symptoms return. Even after treatment, C. diff may come back.  Your doctor may give you an additional medicine if your symptoms come back or you are at risk for another C. diff infection. This medicine is called bezlotoxumab. It is not an antibiotic, but it can help keep your C. diff symptoms from returning.  What are the complications of C. diff infection?  Complications include:    Dehydration    Electrolyte imbalances    Low protein in the blood    Severe widening (dilation) of the large intestine    A hole (perforation) in the bowel    Low blood pressure    Kidney failure    Inflammation or infection all over the body    Death  How is C. diff prevented?  Hospitals and nursing homes take these steps to help prevent C. diff infections:    Limiting use of antibiotics. Giving antibiotics only when needed can help reduce C. diff infections.    Handwashing. Hospital staff should wash their hands before and after treating each person. They should also wash their hands after touching any surface in someone's' room. Soap and water work better than alcohol-based hand .    Protective clothing. Healthcare workers should wear gloves and a gown when entering the room of someone with C. diff infection. They should remove these items before leaving and then wash their hands.    Private rooms. People with C. diff should be in private rooms. Or they may share rooms with others who have the same infection.    Thorough cleaning. Equipment and rooms should be cleaned and disinfected every day.    Education. Everyone should be shown the best ways to avoid infection.  You can do the following to help prevent C. diff:    Take antibiotics only when you really need them. Antibiotics don t help treat illnesses caused by viruses. This includes colds and the flu. Don t ask for antibiotics from your healthcare provider if he or she says they won t work.    When  you are given antibiotics, take them as directed. Don t take more or less than the dosage prescribed. Do not take them for shorter or longer than your provider tells you to, even if you feel better.    Wash your hands carefully. Do this after using the bathroom and before eating. Use plenty of soap and warm water. Alcohol-based hand  may not work against C. diff germs.  Everyone can help prevent C. diff:  In a hospital or care facility:    Wash your hands well before and after visiting someone who has C. diff infection. Use soap and water. Alcohol-based hand  may not work against C. diff.    If the staff asks you to, wear gloves. Take any other steps you are asked to follow to help prevent infection.  At home:    If instructed, wear gloves when caring for a family member with C. diff infection. Throw the gloves away after each use. Then wash your hands well.    Wash the person s clothes, bed linen, and towels separately. Use hot water. Use both detergent and liquid bleach.    Disinfect surfaces in the person s room. This includes the phone, light switches, and remote controls.  Practice good handwashing:    Use warm water and plenty of soap. Rub your hands together well.    Clean your whole hand. Wash under nails, between fingers, and up your wrists.    Wash for at least 15 seconds to 20 seconds.     Rinse. Let the water run down your fingers, not up your wrists.    Dry your hands well. Then use a paper towel to turn off the faucet and open the door.  Date Last Reviewed: 1/1/2017 2000-2019 [employer]. [address] [place] [address], [place] [address]. All rights reserved. This information is not intended as a substitute for professional medical care. Always follow your healthcare professional's instructions.

## 2020-05-19 NOTE — PROGRESS NOTES
"Wilber Hamm is a 49 year old male who is being evaluated via a billable telephone visit.      The patient has been notified of following:     \"This telephone visit will be conducted via a call between you and your physician/provider. We have found that certain health care needs can be provided without the need for a physical exam.  This service lets us provide the care you need with a short phone conversation.  If a prescription is necessary we can send it directly to your pharmacy.  If lab work is needed we can place an order for that and you can then stop by our lab to have the test done at a later time.    Telephone visits are billed at different rates depending on your insurance coverage. During this emergency period, for some insurers they may be billed the same as an in-person visit.  Please reach out to your insurance provider with any questions.    If during the course of the call the physician/provider feels a telephone visit is not appropriate, you will not be charged for this service.\"    Patient has given verbal consent for Telephone visit?  Yes    What phone number would you like to be contacted at? [phone]    How would you like to obtain your AVS? Mail a copy    Subjective     Wilber Hamm is a 49 year old male who presents via phone visit today for the following health issues:    HPI  Gastrointestinal symptoms      Duration: onset 4/18/20     Description:           REFLUX SYMPTOMS - heartburn, acid taste in mouth and epigastric pain       Intensity:  mild    Accompanying signs and symptoms:  Back pain     History  Previous {similar problem: YES- 12-15 years ago   Previous evaluation:  none    Aggravating factors: caffeine    Alleviating factors: proton pump inhibitor - omeprazole 20mg daily prescribed 4/19/20, refilled 5/11/20    Other Therapies tried: None    Past medical, family, and social histories, medications, and allergies are reviewed and updated in [employer].     Review of Systems "   Constitutional, HEENT, cardiovascular, pulmonary, gi and gu systems are negative, except as otherwise noted.       Objective   Reported vitals:  There were no vitals taken for this visit.   PSYCH: Alert and oriented times 3; coherent speech, normal   rate and volume, able to articulate logical thoughts, able   to abstract reason, no tangential thoughts, no hallucinations   or delusions  His affect is normal  RESP: No cough, no audible wheezing, able to talk in full sentences  Remainder of exam unable to be completed due to telephone visits    Diagnostic Test Results:  Labs reviewed in Epic, negative blood work and UA 4/19/20     Ref. Range 5/14/2020 11:01   Helicobacter pylori Antigen Stool Latest Ref Range: NEG^Negative  Negative             ASSESSMENT/PLAN:  (R10.13) Epigastric pain  (primary encounter diagnosis)  Comment: With reflux symptoms.  The patient seems to have one or more of the following: PUD, gastritis, GERD, esophagitis.  Less likely a problem with the gallbladder or pancreas.  Plan: pantoprazole (PROTONIX) 40 MG EC tablet        Handout provided.  Since he may be having side effects from the omeprazole, I will have him switch to pantoprazole.  If he is going to continue taking clindamycin, I want him to take a probiotic as well (handout regarding C. difficile colitis is provided).  Discontinue ibuprofen.Return in about 4 weeks (around 6/16/2020) for recheck if symptoms fail to improve by then, or sooner if symptoms worsen.      Phone call duration:  20 minutes    Jeffrey Ayala MD

## 2020-05-28 ENCOUNTER — VIRTUAL VISIT (OUTPATIENT)
Dept: FAMILY MEDICINE | Facility: CLINIC | Age: 50
End: 2020-05-28
Payer: COMMERCIAL

## 2020-05-28 VITALS — WEIGHT: 205 LBS

## 2020-05-28 DIAGNOSIS — R10.13 EPIGASTRIC PAIN: Primary | ICD-10-CM

## 2020-05-28 PROCEDURE — 99213 OFFICE O/P EST LOW 20 MIN: CPT | Mod: 95 | Performed by: PHYSICIAN ASSISTANT

## 2020-05-28 RX ORDER — SUCRALFATE 1 G/1
1 TABLET ORAL 4 TIMES DAILY
Qty: 60 TABLET | Refills: 1 | Status: SHIPPED | OUTPATIENT
Start: 2020-05-28 | End: 2020-06-10

## 2020-05-28 ASSESSMENT — PAIN SCALES - GENERAL: PAINLEVEL: NO PAIN (0)

## 2020-05-28 NOTE — PATIENT INSTRUCTIONS
At [facility], we strive to deliver an exceptional experience to you, every time we see you. If you receive a survey, please complete it as we do value your feedback.  If you have MyChart, you can expect to receive results automatically within 24 hours of their completion.  Your provider will send a note interpreting your results as well.   If you do not have MyChart, you should receive your results in about a week by mail.    Your care team:                            Family Medicine Internal Medicine   MD Nicholas Atkins MD Shantel Branch-Fleming, MD Katya Georgiev PA-C Megan Hill, APRN CNP    Solomon King, MD Pediatrics   Bladimir Galarza, PALOW Flores, MD Alma Rosa Black APRN CNP   MD Graciela Pelayo MD Deborah Mielke, MD Kim Thein, APRN [facility]      Clinic hours: Monday - Thursday 7 am-7 pm; Fridays 7 am-5 pm.   Urgent care: Monday - Friday 11 am-9 pm; Saturday and Sunday 9 am-5 pm.    Clinic: [phone]       [facility]: Monday - Thursday 8 am - 7 pm; Friday 8 am - 6 pm  [facility]: [phone]     Use [email or web address] for 24/7 diagnosis and treatment of dozens of conditions.    Patient Education     Epigastric Pain (Uncertain Cause)  Epigastric pain is pain in the upper abdomen. It can be a sign of disease. Common causes include:    Acid reflux (stomach acid flowing up into the esophagus)    Gastritis (irritation of the stomach lining) Most often this is from aspirin or NSAID medicines such as ibuprofen, bacteria called H. pylori, or frequent alcohol use.    Peptic ulcer disease    Inflammation of the pancreas    Gallstone    Infection in the gallbladder  Pain may be dull or burning. It may spread upward to the chest or to the back. There may be other symptoms such as belching, bloating, cramps or hunger pains. There may be weight loss or poor appetite, nausea or vomiting.  Since the  cause of your pain is not certain yet,you may need more tests. Sometimes the doctor will treat you for the most likely condition to see if there is improvement before doing more tests.  Home care  Medicines    Antacids help neutralize the normal acids in your stomach.If you don t like the liquid, you can try a chewable one. You may find one works better than another for you. Overuse can cause diarrhea or constipation.    Acid blockers (H2 blockers) decrease acid production. Examples are cimetidine, famotidine, and ranitidine.    Acid inhibitors (PPIs) decrease acid production in a different way than the blockers. You may find they work better, but can take a little longer to take effect.  Examples are omeprazole, lansoprazole, pantoprazole, rabeprazole, and esomeprazole. Many of these are available over-the-counter or available as generics.    Take an antacid 30 to 60 minutes after eating and at bedtime, but not at the same time as an acid blocker.    Try not to take NSAIDs such as ibuprofen. Aspirin may also cause problems, but if taking it for your heart or other medical reasons, talk to your doctor before stopping it; you don't want to cause a worse problem, like a heart attack or stroke.  Diet    If certain foods seem to cause your pain, try not to eat them. Certain foods can worsen symptoms of gastritis. Limit or avoid fatty, fried, and spicy foods, as well as coffee, chocolate, mint, and foods with high acid content such as tomatoes and citrus fruit and juices (orange, grapefruit, lemon).    Eat slowly and chew food well before swallowing. Symptoms of gastritis can be worsened by certain foods.    Don't drink alcohol. It can irritate the stomach.    Don't consume caffeine, or use tobacco. These can delay healing and worsen your problem.    Try eating smaller meals with snacks in between.    Keep an empty stomach for 2 to 3 hours before lying down.    Prop the head of the bed up if you have overnight symptoms.  This helps acid clear from your esophagus.    Follow-up care  Follow up with your healthcare provider or as advised.  When to seek medical advice  Call your healthcare provider right away if any of the following occur:    Stomach pain worsens or moves to the right lower part of the abdomen    Chest pain appears, or if it worsens or spreads to the chest, back, neck, shoulder, or arm    Frequent vomiting (can t keep down liquids)    Blood in the stool or vomit (red or black color)    Feeling weak or dizzy, fainting, or having trouble breathing    Fever of 100.4 F (38 C) or higher, or as directed by your healthcare provider    Abdominal swelling  Date Last Reviewed: 3/1/2018    9912-8593 [employer]. [address] [place] [address], [place] [address]. All rights reserved. This information is not intended as a substitute for professional medical care. Always follow your healthcare professional's instructions.

## 2020-05-28 NOTE — PROGRESS NOTES
"Wilber Hamm is a 49 year old male who is being evaluated via a billable telephone visit.      The patient has been notified of following:     \"This telephone visit will be conducted via a call between you and your physician/provider. We have found that certain health care needs can be provided without the need for a physical exam.  This service lets us provide the care you need with a short phone conversation.  If a prescription is necessary we can send it directly to your pharmacy.  If lab work is needed we can place an order for that and you can then stop by our lab to have the test done at a later time.    Telephone visits are billed at different rates depending on your insurance coverage. During this emergency period, for some insurers they may be billed the same as an in-person visit.  Please reach out to your insurance provider with any questions.    If during the course of the call the physician/provider feels a telephone visit is not appropriate, you will not be charged for this service.\"    Patient has given verbal consent for Telephone visit?  Yes    What phone number would you like to be contacted at? [phone]    How would you like to obtain your AVS? Mail a copy    Subjective     Wilber Hamm is a 49 year old male who presents via phone visit today for the following health issues:    HPI  Abdominal Pain      Duration: 5.5 weeks    Description (location/character/radiation): mid abdomen       Associated flank pain: None    Intensity:  moderate    Accompanying signs and symptoms:        Fever/Chills: no        Gas/Bloating: YES       Nausea/vomitting: YES       Diarrhea: no        Dysuria or Hematuria: no     History (previous similar pain/trauma/previous testing): none    Precipitating or alleviating factors:       Pain worse with eating/BM/urination: none       Pain relieved by BM: YES    Therapies tried and outcome: Omeprazole and protonix    LMP:  not applicable     seen several times for same, " blood work 4/19 was normal. H pylori was negative 5/14  Pain waxes and wanes     Reviewed and updated as needed this visit by Provider         Review of Systems   CONSTITUTIONAL: NEGATIVE for fever, chills, change in weight  RESP: NEGATIVE for significant cough or SOB  GI: dyspepsia       Objective   Reported vitals:  Wt 93 kg (205 lb)    healthy, alert and no distress  PSYCH: Alert and oriented times 3; coherent speech, normal   rate and volume, able to articulate logical thoughts, able   to abstract reason, no tangential thoughts, no hallucinations   or delusions  His affect is normal  RESP: No cough, no audible wheezing, able to talk in full sentences  Remainder of exam unable to be completed due to telephone visits    Diagnostic Test Results:  Labs reviewed in Epic        Assessment/Plan:will recheck labs, refer  1. Epigastric pain     - sucralfate (CARAFATE) 1 GM tablet; Take 1 tablet (1 g) by mouth 4 times daily  Dispense: 60 tablet; Refill: 1  - CBC with platelets differential; Future  - Comprehensive metabolic panel; Future  - Lipase; Future  - GASTROENTEROLOGY ADULT REF CONSULT ONLY; Future    Return in about 10 days (around 6/7/2020) for Specialist Follow-up.      Phone call duration:  10 minutes    CEDRIC Faustin

## 2020-05-29 NOTE — TELEPHONE ENCOUNTER
RECORDS RECEIVED FROM: [facility]- CEDRIC Remy   DATE RECEIVED: 6/10/2020   NOTES STATUS DETAILS   OFFICE NOTE from referring provider Internal 5/28/2020 Virtual visit with CEDRIC Remy   OFFICE NOTE from other specialist Internal 5/19/2020 Virtual visit with Dr. Jeffrey Ayala ([facility])     4/19/2020 Office visit with Heather Delacruz PA-C ([facility])    DISCHARGE SUMMARY from hospital N/A    OPERATIVE REPORT N/A    MEDICATION LIST Internal         ENDOSCOPY  N/A    COLONOSCOPY N/A    ERCP N/A    EUS N/A    STOOL TESTING Care Everywhere 12/24/18   PERTINENT LABS Internal    PATHOLOGY REPORTS (RELATED) N/A    IMAGING (CT, MRI, EGD) N/A      REFERRAL INFORMATION    Date referral was placed: 6/10/2020   Date all records received: N/A   Date records were scanned into Epic: NA   Date records were sent to Provider to review: N/A   Date and recommendation received from provider:  LETTER SENT  SCHEDULE APPOINTMENT   Date patient was contacted to schedule: 5/29/2020

## 2020-06-04 DIAGNOSIS — R10.13 EPIGASTRIC PAIN: ICD-10-CM

## 2020-06-04 LAB
ALBUMIN SERPL-MCNC: 4 G/DL (ref 3.4–5)
ALP SERPL-CCNC: 68 U/L (ref 40–150)
ALT SERPL W P-5'-P-CCNC: 26 U/L (ref 0–70)
ANION GAP SERPL CALCULATED.3IONS-SCNC: 4 MMOL/L (ref 3–14)
AST SERPL W P-5'-P-CCNC: 20 U/L (ref 0–45)
BASOPHILS # BLD AUTO: 0 10E9/L (ref 0–0.2)
BASOPHILS NFR BLD AUTO: 0.2 %
BILIRUB SERPL-MCNC: 0.7 MG/DL (ref 0.2–1.3)
BUN SERPL-MCNC: 16 MG/DL (ref 7–30)
CALCIUM SERPL-MCNC: 9.5 MG/DL (ref 8.5–10.1)
CHLORIDE SERPL-SCNC: 105 MMOL/L (ref 94–109)
CO2 SERPL-SCNC: 29 MMOL/L (ref 20–32)
CREAT SERPL-MCNC: 0.9 MG/DL (ref 0.66–1.25)
DIFFERENTIAL METHOD BLD: NORMAL
EOSINOPHIL # BLD AUTO: 0.1 10E9/L (ref 0–0.7)
EOSINOPHIL NFR BLD AUTO: 1.8 %
ERYTHROCYTE [DISTWIDTH] IN BLOOD BY AUTOMATED COUNT: 12 % (ref 10–15)
GFR SERPL CREATININE-BSD FRML MDRD: >90 ML/MIN/{1.73_M2}
GLUCOSE SERPL-MCNC: 90 MG/DL (ref 70–99)
HCT VFR BLD AUTO: 44.3 % (ref 40–53)
HGB BLD-MCNC: 14.8 G/DL (ref 13.3–17.7)
LIPASE SERPL-CCNC: 114 U/L (ref 73–393)
LYMPHOCYTES # BLD AUTO: 1.5 10E9/L (ref 0.8–5.3)
LYMPHOCYTES NFR BLD AUTO: 27.3 %
MCH RBC QN AUTO: 29.2 PG (ref 26.5–33)
MCHC RBC AUTO-ENTMCNC: 33.4 G/DL (ref 31.5–36.5)
MCV RBC AUTO: 88 FL (ref 78–100)
MONOCYTES # BLD AUTO: 0.5 10E9/L (ref 0–1.3)
MONOCYTES NFR BLD AUTO: 9.8 %
NEUTROPHILS # BLD AUTO: 3.3 10E9/L (ref 1.6–8.3)
NEUTROPHILS NFR BLD AUTO: 60.9 %
PLATELET # BLD AUTO: 209 10E9/L (ref 150–450)
POTASSIUM SERPL-SCNC: 4.1 MMOL/L (ref 3.4–5.3)
PROT SERPL-MCNC: 7.6 G/DL (ref 6.8–8.8)
RBC # BLD AUTO: 5.06 10E12/L (ref 4.4–5.9)
SODIUM SERPL-SCNC: 138 MMOL/L (ref 133–144)
WBC # BLD AUTO: 5.5 10E9/L (ref 4–11)

## 2020-06-04 PROCEDURE — 36415 COLL VENOUS BLD VENIPUNCTURE: CPT | Performed by: PHYSICIAN ASSISTANT

## 2020-06-04 PROCEDURE — 83690 ASSAY OF LIPASE: CPT | Performed by: PHYSICIAN ASSISTANT

## 2020-06-04 PROCEDURE — 85025 COMPLETE CBC W/AUTO DIFF WBC: CPT | Performed by: PHYSICIAN ASSISTANT

## 2020-06-04 PROCEDURE — 80053 COMPREHEN METABOLIC PANEL: CPT | Performed by: PHYSICIAN ASSISTANT

## 2020-06-10 ENCOUNTER — PATIENT OUTREACH (OUTPATIENT)
Dept: GASTROENTEROLOGY | Facility: CLINIC | Age: 50
End: 2020-06-10

## 2020-06-10 ENCOUNTER — VIRTUAL VISIT (OUTPATIENT)
Dept: GASTROENTEROLOGY | Facility: CLINIC | Age: 50
End: 2020-06-10
Attending: PHYSICIAN ASSISTANT
Payer: COMMERCIAL

## 2020-06-10 ENCOUNTER — PRE VISIT (OUTPATIENT)
Dept: GASTROENTEROLOGY | Facility: CLINIC | Age: 50
End: 2020-06-10

## 2020-06-10 DIAGNOSIS — R10.13 EPIGASTRIC PAIN: ICD-10-CM

## 2020-06-10 RX ORDER — SUCRALFATE 1 G/1
1 TABLET ORAL 4 TIMES DAILY
Qty: 120 TABLET | Refills: 3 | Status: SHIPPED | OUTPATIENT
Start: 2020-06-10 | End: 2020-09-15

## 2020-06-10 ASSESSMENT — PAIN SCALES - GENERAL: PAINLEVEL: NO PAIN (0)

## 2020-06-10 NOTE — PATIENT INSTRUCTIONS
- follow up with primary care clinic regarding your past history of lightheadedness and recurrence of this symtom  - recommend increasing your hydration particularly when you're exercising in this weather  - stop the pantoprazole --> anytime after 14 days off of the pantroprazole you can submit another stool sample for H pylori   - ok to continue sucralfate/Carafate  - follow-up in GI clinic in 6-8 weeks with CEDRIC Linares (after repeat testing) or Dr. Weber     If you have any questions, please don't hesitate to contact me through our GI RN Clinic Coordinator, Hilda Reynolds, at [phone].

## 2020-06-10 NOTE — NURSING NOTE
Chief Complaint   Patient presents with     Consult     new consult       There were no vitals filed for this visit.    There is no height or weight on file to calculate BMI.    Chela Edwards, CMA

## 2020-06-10 NOTE — PROGRESS NOTES
Patient called in with questions related to his sucralfate and Protonix dosing.  patient will stop  His Protonix for two weeks and then give the stool sample. patient is also requesting refill on the sucralfate, which was refilled and sent

## 2020-06-10 NOTE — LETTER
"    6/10/2020         RE: Wilber Hamm  [address] [place] [address]        Dear Colleague,    Thank you for referring your patient, Wilber Hamm, to the [facility]. Please see a copy of my visit note below.         Wilber Hamm is a 49 year old male who is being evaluated via a billable telephone visit.      The patient has been notified of following:     \"This telephone visit will be conducted via a call between you and your physician/provider. We have found that certain health care needs can be provided without the need for a physical exam.  This service lets us provide the care you need with a short phone conversation.  If a prescription is necessary we can send it directly to your pharmacy.  If lab work is needed we can place an order for that and you can then stop by our lab to have the test done at a later time.    Telephone visits are billed at different rates depending on your insurance coverage. During this emergency period, for some insurers they may be billed the same as an in-person visit.  Please reach out to your insurance provider with any questions.    If during the course of the call the physician/provider feels a telephone visit is not appropriate, you will not be charged for this service.\"    Patient has given verbal consent for Telephone visit?  Yes    What phone number would you like to be contacted at? [phone]    How would you like to obtain your AVS? MyCmaxit    Phone call duration: 53 minutes    -----------------------------------    GI CLINIC VISIT    CC/REFERRING MD:  Andrei Sharp*  REASON FOR CONSULTATION:   Mr. Hamm is a 49 year old male who I was asked to see in consultation at the request of Dr. Andrei Sharp* for   Chief Complaint   Patient presents with     Consult     new consult       ASSESSMENT/PLAN:  (R10.13) Epigastric pain    H pylori vs dyspepsia (epigastric pain syndrome) vs gastritis/PUD - H pylori " "tested after weeks of PPI - risk of false negative.     Discussed potential for EGD - patient feels that he is so much better and would like to wait and see \"where he's at\". I think this is reasonable with no red flag symptoms at this time and marked (self-reported) clinical improvement. He does share that if symptoms persist or red flag symptoms develop he would be open to pursuit of an EGD.  After some discussion we decided to ultimately retest H pylori stool Ag off of PPI with close symptom monitoring. If increased symptoms off of PPI he should contact GI clinic and we can discuss timing of potential restart +/-further testing via EGD. See below for further details.    In regards to lightheadedness concerns - this would be an unusual side effect of PPI and doesn't seem to have necessarily resolved with switch from omeprazole to pantoprazole (noted 2% \"dizziness\" reported with omeprazole). Lightheadedness chief complaints date back a few years in the EMR - pt reports prior episodes were attributed to \"anxiety\". He denies any specific evaluation for this in the past. No EKG on file.    - follow up with primary care clinic regarding your past history of lightheadedness and recurrence of this symtom  - recommend increasing your hydration particularly when you're exercising in this weather  - stop the pantoprazole --> anytime after 14 days off of the pantroprazole you can submit another stool sample for H pylori   - ok to continue sucralfate/Carafate  - follow-up in GI clinic in 6-8 weeks with CEDRIC Linares (after repeat testing)       Thank you for this consultation.  It was a pleasure to participate in the care of this patient; please contact us with any further questions.  A total of 53 telephone minutes was spent with this patient, >50% of which was counseling regarding the above delineated issues.    Heather Weber MD   of Medicine  Division of Gastroenterology, Hepatology and " "Nutrition  [facility]    ---------------------------------------------------------------------------------------------------  HPI:     Mr. Hamm is a 49 year old male with prior panic attacks and s/p recent tooth extraction who is referred to GI clinic for evaluation of epigastric pain.    Mr. Hamm states he was in his normal state of health until he awoke one Saturday in late April with a pain in his \"upper stomach\". This was described as a \"discomfort\" as \"if something was moving around in there that was painful\". He had some trouble characterizing it finally settling on calling it a \"gnawing\" sensation (denying a sense of cramping or stabbing. This was associated with an \"acid-y\" taste in his mouth that morning and a \"gas\" feeling with lots of eructation which persisted for some time. He feels his appetite may have been slightly decreased as well.    Looking back, he does feel for a few weeks prior to the epigastric discomfort he'd been having more noticeable borborygmi.  He also feels that for 3-4 weeks prior he was doing an intense abdominal work-out with his son and wonders if this \"damaged\" something.    He was seen in his primary care clinic a few days after symptom onset and was put on omeprazole. He took this for a few days and felt somewhat better, but reports in the evening he was feeling lightheaded and he wondered if it was the medication. He continue the omeprazole for a week in total and felt that his stomach was back to \"normal.\" He stopped the omeprazole and felt well for another week.      Unfortunately, after a week without symptoms, Mr. Hamm's pain began again. He was seen again in primary care clinic and started back on omeprazole. He was tested for H pylori via stool Ag at that time and was found to be negative. He confirms he was on PPI at the time of the test (and for some time prior). The omeprazole helped, but his symptoms didn't totally shea and he was re-evaluated " "in primary care. He was switched to pantoprazole due to concern for possible side effects from omeprazole (?lightheadedness). He was on clindamycin at this time as well for a tooth extraction. Though today he describes only epigastric discomfort in primary care documentation, heartburn and \"acid\" taste in mouth are mentioned more prominently. Last week Mr. Hamm was seen in primary care for follow-up and pain was then described as mid-abdomen and flank (though today he localizes to the epigastric region). CMP, CBC, and lipase were all normal. He was started on sucralfate at that time (about 10 days ago) which he has taken about three times daily (separate from meals and other medications).    Mr. Hamm feels that for the last 7-10 days he's done much better. Most days he feels \"perfect\", but some days has some much more mild discomfort. He also states he started taking a small amount of diluted apple cider vinegar in the last few days and feels this further improved things.    For interventions he also stopped his abdominal muscle exercises, but has continued to run and otherwise workout without issue. He cut out caffeine (used to drink one cup of coffee a day) and hasn't had any coffee in 5 weeks, though he doesn't feel this made a big difference. He was on 850 mg of ibuprofen for 10 days or so after his tooth extraction (and after pain had already begun). He has since stopped this and, notably, was on PPI while using the NSAID.     He thinks he had an episode of similar pain 15 years ago, but he doesn't recall much other than experiencing a \"gnawing\" sensation. He thinks he was treated with something - maybe an antibiotic, he can't recall and this resolved fairly quickly.      GI ROS:  Dysphagia: none   Odynophagia: none  GERD symptoms: none - denies any issues with heartburn  Nausea/vomiting: mild nausea intermittently - seems to be independent of pain, denies any vomiting  Hematemesis/melena/hematochezia: " "none  Baseline stools: typically one formed BM daily  Diarrhea: none, had a couple days of loose stools while on clindamycin - this resolved on its own  Constipation: none - noticing BMs are more firm since starting carafate, denies any straining  Eructation: see HPI, does feel he was burping a lot more than baseline at the start of this, this has improved but not back to normal yet  Flatus: no change from baseline  Abdominal pain: see HPI  Weight loss: none, feels weight is stable   NSAIDs: was on 850 mg ibuprofen daily for 10 days after root canal (after pain had started) - on PPI while on NSAID  Oral steroids: none     ROS:    Fevers/chills: none  Headache: none  Vision changes: none  Chest pain/pressure: none  Dyspnea: none  Skin changes/rashes: none  Lymphadenopathy: none  Myalgias/Arthralgias: none  Anxiety/depression: none recently, reports history of periodic \"anxiety attacks\" which he takes prn hydroxyzine - typically happens 1-2 times a year    ROS is notable for concern regarding intermittent lightheadedness.      PERTINENT PAST MEDICAL HISTORY:  Panic attacks    PREVIOUS SURGERIES:  S/p tooth extraction May 2020      PREVIOUS ENDOSCOPY:  None    ALLERGIES:   No Known Allergies    PERTINENT MEDICATIONS:  Current Outpatient Medications   Medication     clindamycin (CLEOCIN) 300 MG capsule     hydrOXYzine (ATARAX) 25 MG tablet     pantoprazole (PROTONIX) 40 MG EC tablet     sucralfate (CARAFATE) 1 GM tablet     Current Facility-Administered Medications   Medication     lidocaine (XYLOCAINE) 2 % 15 mL, alum & mag hydroxide-simethicone (MAALOX  ES) 15 mL GI Cocktail       SOCIAL HISTORY:  Social History     Socioeconomic History     Marital status: Single     Spouse name: Not on file     Number of children: Not on file     Years of education: Not on file     Highest education level: Not on file   Occupational History     Not on file   Social Needs     Financial resource strain: Not on file     Food insecurity "     Worry: Not on file     Inability: Not on file     Transportation needs     Medical: Not on file     Non-medical: Not on file   Tobacco Use     Smoking status: Never Smoker     Smokeless tobacco: Never Used   Substance and Sexual Activity     Alcohol use: Not Currently     Drug use: Never     Sexual activity: Not Currently   Lifestyle     Physical activity     Days per week: Not on file     Minutes per session: Not on file     Stress: Not on file   Relationships     Social connections     Talks on phone: Not on file     Gets together: Not on file     Attends [employer] service: Not on file     Active member of club or organization: Not on file     Attends meetings of clubs or organizations: Not on file     Relationship status: Not on file     Intimate partner violence     Fear of current or ex partner: Not on file     Emotionally abused: Not on file     Physically abused: Not on file     Forced sexual activity: Not on file   Other Topics Concern     Not on file   Social History Narrative     Not on file       FAMILY HISTORY:  Family History   Problem Relation Age of Onset     Colon Cancer No family hx of      Colon Polyps No family hx of        Mother: reportedly healthy, may have had some issues with GERD  Father: DMT2  Siblings: reportedly all healthy    Denies any known GI malignancies, IBD, or other GI diagnoses in the family.    PHYSICAL EXAMINATION:  Vitals There were no vitals taken for this visit.   Wt   Wt Readings from Last 2 Encounters:   05/28/20 93 kg (205 lb)   04/19/20 94.4 kg (208 lb 3.2 oz)     Exam deferred as visit was conducted via telephone.    PERTINENT STUDIES:    Orders Only on 06/04/2020   Component Date Value Ref Range Status     Lipase 06/04/2020 114  73 - 393 U/L Final     WBC 06/04/2020 5.5  4.0 - 11.0 10e9/L Final     RBC Count 06/04/2020 5.06  4.4 - 5.9 10e12/L Final     Hemoglobin 06/04/2020 14.8  13.3 - 17.7 g/dL Final     Hematocrit 06/04/2020 44.3  40.0 - 53.0 % Final     MCV  06/04/2020 88  78 - 100 fl Final     MCH 06/04/2020 29.2  26.5 - 33.0 pg Final     MCHC 06/04/2020 33.4  31.5 - 36.5 g/dL Final     RDW 06/04/2020 12.0  10.0 - 15.0 % Final     Platelet Count 06/04/2020 209  150 - 450 10e9/L Final     % Neutrophils 06/04/2020 60.9  % Final     % Lymphocytes 06/04/2020 27.3  % Final     % Monocytes 06/04/2020 9.8  % Final     % Eosinophils 06/04/2020 1.8  % Final     % Basophils 06/04/2020 0.2  % Final     Absolute Neutrophil 06/04/2020 3.3  1.6 - 8.3 10e9/L Final     Absolute Lymphocytes 06/04/2020 1.5  0.8 - 5.3 10e9/L Final     Absolute Monocytes 06/04/2020 0.5  0.0 - 1.3 10e9/L Final     Absolute Eosinophils 06/04/2020 0.1  0.0 - 0.7 10e9/L Final     Absolute Basophils 06/04/2020 0.0  0.0 - 0.2 10e9/L Final     Diff Method 06/04/2020 Automated Method   Final     Sodium 06/04/2020 138  133 - 144 mmol/L Final     Potassium 06/04/2020 4.1  3.4 - 5.3 mmol/L Final     Chloride 06/04/2020 105  94 - 109 mmol/L Final     Carbon Dioxide 06/04/2020 29  20 - 32 mmol/L Final     Anion Gap 06/04/2020 4  3 - 14 mmol/L Final     Glucose 06/04/2020 90  70 - 99 mg/dL Final     Urea Nitrogen 06/04/2020 16  7 - 30 mg/dL Final     Creatinine 06/04/2020 0.90  0.66 - 1.25 mg/dL Final     GFR Estimate 06/04/2020 >90  >60 mL/min/[1.73_m2] Final     GFR Estimate If Black 06/04/2020 >90  >60 mL/min/[1.73_m2] Final     Calcium 06/04/2020 9.5  8.5 - 10.1 mg/dL Final     Bilirubin Total 06/04/2020 0.7  0.2 - 1.3 mg/dL Final     Albumin 06/04/2020 4.0  3.4 - 5.0 g/dL Final     Protein Total 06/04/2020 7.6  6.8 - 8.8 g/dL Final     Alkaline Phosphatase 06/04/2020 68  40 - 150 U/L Final     ALT 06/04/2020 26  0 - 70 U/L Final     AST 06/04/2020 20  0 - 45 U/L Final               Again, thank you for allowing me to participate in the care of your patient.        Sincerely,        Heather Weber MD

## 2020-06-10 NOTE — PROGRESS NOTES
"Wilber Hamm is a 49 year old male who is being evaluated via a billable telephone visit.      The patient has been notified of following:     \"This telephone visit will be conducted via a call between you and your physician/provider. We have found that certain health care needs can be provided without the need for a physical exam.  This service lets us provide the care you need with a short phone conversation.  If a prescription is necessary we can send it directly to your pharmacy.  If lab work is needed we can place an order for that and you can then stop by our lab to have the test done at a later time.    Telephone visits are billed at different rates depending on your insurance coverage. During this emergency period, for some insurers they may be billed the same as an in-person visit.  Please reach out to your insurance provider with any questions.    If during the course of the call the physician/provider feels a telephone visit is not appropriate, you will not be charged for this service.\"    Patient has given verbal consent for Telephone visit?  Yes    What phone number would you like to be contacted at? [phone]    How would you like to [facility] your [facility]? [facility]    Phone call duration: 53 minutes    -----------------------------------    GI CLINIC VISIT    CC/REFERRING MD:  Andrei Sharp*  REASON FOR CONSULTATION:   Mr. Hamm is a 49 year old male who I was asked to see in consultation at the request of Dr. Andrei Sharp* for   Chief Complaint   Patient presents with     Consult     new consult       ASSESSMENT/PLAN:  (R10.13) Epigastric pain    H pylori vs dyspepsia (epigastric pain syndrome) vs gastritis/PUD - H pylori tested after weeks of PPI - risk of false negative.     Discussed potential for EGD - patient feels that he is so much better and would like to wait and see \"where he's at\". I think this is reasonable with no red flag symptoms at this time and marked " "(self-reported) clinical improvement. He does share that if symptoms persist or red flag symptoms develop he would be open to pursuit of an EGD.  After some discussion we decided to ultimately retest H pylori stool Ag off of PPI with close symptom monitoring. If increased symptoms off of PPI he should contact GI clinic and we can discuss timing of potential restart +/-further testing via EGD. See below for further details.    In regards to lightheadedness concerns - this would be an unusual side effect of PPI and doesn't seem to have necessarily resolved with switch from omeprazole to pantoprazole (noted 2% \"dizziness\" reported with omeprazole). Lightheadedness chief complaints date back a few years in the EMR - pt reports prior episodes were attributed to \"anxiety\". He denies any specific evaluation for this in the past. No EKG on file.    - follow up with primary care clinic regarding your past history of lightheadedness and recurrence of this symtom  - recommend increasing your hydration particularly when you're exercising in this weather  - stop the pantoprazole --> anytime after 14 days off of the pantroprazole you can submit another stool sample for H pylori   - ok to continue sucralfate/Carafate  - follow-up in GI clinic in 6-8 weeks with CEDRIC Linares (after repeat testing)       Thank you for this consultation.  It was a pleasure to participate in the care of this patient; please contact us with any further questions.  A total of 53 telephone minutes was spent with this patient, >50% of which was counseling regarding the above delineated issues.    Heather Weber MD   of Medicine  Division of Gastroenterology, Hepatology and Nutrition  [facility]    ---------------------------------------------------------------------------------------------------  HPI:     Mr. Hamm is a 49 year old male with prior panic attacks and s/p recent tooth extraction who is referred to " "GI clinic for evaluation of epigastric pain.    Mr. Hamm states he was in his normal state of health until he awoke one Saturday in late April with a pain in his \"upper stomach\". This was described as a \"discomfort\" as \"if something was moving around in there that was painful\". He had some trouble characterizing it finally settling on calling it a \"gnawing\" sensation (denying a sense of cramping or stabbing. This was associated with an \"acid-y\" taste in his mouth that morning and a \"gas\" feeling with lots of eructation which persisted for some time. He feels his appetite may have been slightly decreased as well.    Looking back, he does feel for a few weeks prior to the epigastric discomfort he'd been having more noticeable borborygmi.  He also feels that for 3-4 weeks prior he was doing an intense abdominal work-out with his son and wonders if this \"damaged\" something.    He was seen in his primary care clinic a few days after symptom onset and was put on omeprazole. He took this for a few days and felt somewhat better, but reports in the evening he was feeling lightheaded and he wondered if it was the medication. He continue the omeprazole for a week in total and felt that his stomach was back to \"normal.\" He stopped the omeprazole and felt well for another week.      Unfortunately, after a week without symptoms, Mr. Hamm's pain began again. He was seen again in primary care clinic and started back on omeprazole. He was tested for H pylori via stool Ag at that time and was found to be negative. He confirms he was on PPI at the time of the test (and for some time prior). The omeprazole helped, but his symptoms didn't totally shea and he was re-evaluated in primary care. He was switched to pantoprazole due to concern for possible side effects from omeprazole (?lightheadedness). He was on clindamycin at this time as well for a tooth extraction. Though today he describes only epigastric discomfort in primary " "care documentation, heartburn and \"acid\" taste in mouth are mentioned more prominently. Last week Mr. Hamm was seen in primary care for follow-up and pain was then described as mid-abdomen and flank (though today he localizes to the epigastric region). CMP, CBC, and lipase were all normal. He was started on sucralfate at that time (about 10 days ago) which he has taken about three times daily (separate from meals and other medications).    Mr. Hamm feels that for the last 7-10 days he's done much better. Most days he feels \"perfect\", but some days has some much more mild discomfort. He also states he started taking a small amount of diluted apple cider vinegar in the last few days and feels this further improved things.    For interventions he also stopped his abdominal muscle exercises, but has continued to run and otherwise workout without issue. He cut out caffeine (used to drink one cup of coffee a day) and hasn't had any coffee in 5 weeks, though he doesn't feel this made a big difference. He was on 850 mg of ibuprofen for 10 days or so after his tooth extraction (and after pain had already begun). He has since stopped this and, notably, was on PPI while using the NSAID.     He thinks he had an episode of similar pain 15 years ago, but he doesn't recall much other than experiencing a \"gnawing\" sensation. He thinks he was treated with something - maybe an antibiotic, he can't recall and this resolved fairly quickly.      GI ROS:  Dysphagia: none   Odynophagia: none  GERD symptoms: none - denies any issues with heartburn  Nausea/vomiting: mild nausea intermittently - seems to be independent of pain, denies any vomiting  Hematemesis/melena/hematochezia: none  Baseline stools: typically one formed BM daily  Diarrhea: none, had a couple days of loose stools while on clindamycin - this resolved on its own  Constipation: none - noticing BMs are more firm since starting carafate, denies any straining  Eructation: " "see HPI, does feel he was burping a lot more than baseline at the start of this, this has improved but not back to normal yet  Flatus: no change from baseline  Abdominal pain: see HPI  Weight loss: none, feels weight is stable   NSAIDs: was on 850 mg ibuprofen daily for 10 days after root canal (after pain had started) - on PPI while on NSAID  Oral steroids: none     ROS:    Fevers/chills: none  Headache: none  Vision changes: none  Chest pain/pressure: none  Dyspnea: none  Skin changes/rashes: none  Lymphadenopathy: none  Myalgias/Arthralgias: none  Anxiety/depression: none recently, reports history of periodic \"anxiety attacks\" which he takes prn hydroxyzine - typically happens 1-2 times a year    ROS is notable for concern regarding intermittent lightheadedness.      PERTINENT PAST MEDICAL HISTORY:  Panic attacks    PREVIOUS SURGERIES:  S/p tooth extraction May 2020      PREVIOUS ENDOSCOPY:  None    ALLERGIES:   No Known Allergies    PERTINENT MEDICATIONS:  Current Outpatient Medications   Medication     clindamycin (CLEOCIN) 300 MG capsule     hydrOXYzine (ATARAX) 25 MG tablet     pantoprazole (PROTONIX) 40 MG EC tablet     sucralfate (CARAFATE) 1 GM tablet     Current Facility-Administered Medications   Medication     lidocaine (XYLOCAINE) 2 % 15 mL, alum & mag hydroxide-simethicone (MAALOX  ES) 15 mL GI Cocktail       SOCIAL HISTORY:  Social History     Socioeconomic History     Marital status: Single     Spouse name: Not on file     Number of children: Not on file     Years of education: Not on file     Highest education level: Not on file   Occupational History     Not on file   Social Needs     Financial resource strain: Not on file     Food insecurity     Worry: Not on file     Inability: Not on file     Transportation needs     Medical: Not on file     Non-medical: Not on file   Tobacco Use     Smoking status: Never Smoker     Smokeless tobacco: Never Used   Substance and Sexual Activity     Alcohol use: " Not Currently     Drug use: Never     Sexual activity: Not Currently   Lifestyle     Physical activity     Days per week: Not on file     Minutes per session: Not on file     Stress: Not on file   Relationships     Social connections     Talks on phone: Not on file     Gets together: Not on file     Attends Mosque service: Not on file     Active member of club or organization: Not on file     Attends meetings of clubs or organizations: Not on file     Relationship status: Not on file     Intimate partner violence     Fear of current or ex partner: Not on file     Emotionally abused: Not on file     Physically abused: Not on file     Forced sexual activity: Not on file   Other Topics Concern     Not on file   Social History Narrative     Not on file       FAMILY HISTORY:  Family History   Problem Relation Age of Onset     Colon Cancer No family hx of      Colon Polyps No family hx of        Mother: reportedly healthy, may have had some issues with GERD  Father: DMT2  Siblings: reportedly all healthy    Denies any known GI malignancies, IBD, or other GI diagnoses in the family.    PHYSICAL EXAMINATION:  Vitals There were no vitals taken for this visit.   Wt   Wt Readings from Last 2 Encounters:   05/28/20 93 kg (205 lb)   04/19/20 94.4 kg (208 lb 3.2 oz)     Exam deferred as visit was conducted via telephone.    PERTINENT STUDIES:    Orders Only on 06/04/2020   Component Date Value Ref Range Status     Lipase 06/04/2020 114  73 - 393 U/L Final     WBC 06/04/2020 5.5  4.0 - 11.0 10e9/L Final     RBC Count 06/04/2020 5.06  4.4 - 5.9 10e12/L Final     Hemoglobin 06/04/2020 14.8  13.3 - 17.7 g/dL Final     Hematocrit 06/04/2020 44.3  40.0 - 53.0 % Final     MCV 06/04/2020 88  78 - 100 fl Final     MCH 06/04/2020 29.2  26.5 - 33.0 pg Final     MCHC 06/04/2020 33.4  31.5 - 36.5 g/dL Final     RDW 06/04/2020 12.0  10.0 - 15.0 % Final     Platelet Count 06/04/2020 209  150 - 450 10e9/L Final     % Neutrophils 06/04/2020  60.9  % Final     % Lymphocytes 06/04/2020 27.3  % Final     % Monocytes 06/04/2020 9.8  % Final     % Eosinophils 06/04/2020 1.8  % Final     % Basophils 06/04/2020 0.2  % Final     Absolute Neutrophil 06/04/2020 3.3  1.6 - 8.3 10e9/L Final     Absolute Lymphocytes 06/04/2020 1.5  0.8 - 5.3 10e9/L Final     Absolute Monocytes 06/04/2020 0.5  0.0 - 1.3 10e9/L Final     Absolute Eosinophils 06/04/2020 0.1  0.0 - 0.7 10e9/L Final     Absolute Basophils 06/04/2020 0.0  0.0 - 0.2 10e9/L Final     Diff Method 06/04/2020 Automated Method   Final     Sodium 06/04/2020 138  133 - 144 mmol/L Final     Potassium 06/04/2020 4.1  3.4 - 5.3 mmol/L Final     Chloride 06/04/2020 105  94 - 109 mmol/L Final     Carbon Dioxide 06/04/2020 29  20 - 32 mmol/L Final     Anion Gap 06/04/2020 4  3 - 14 mmol/L Final     Glucose 06/04/2020 90  70 - 99 mg/dL Final     Urea Nitrogen 06/04/2020 16  7 - 30 mg/dL Final     Creatinine 06/04/2020 0.90  0.66 - 1.25 mg/dL Final     GFR Estimate 06/04/2020 >90  >60 mL/min/[1.73_m2] Final     GFR Estimate If Black 06/04/2020 >90  >60 mL/min/[1.73_m2] Final     Calcium 06/04/2020 9.5  8.5 - 10.1 mg/dL Final     Bilirubin Total 06/04/2020 0.7  0.2 - 1.3 mg/dL Final     Albumin 06/04/2020 4.0  3.4 - 5.0 g/dL Final     Protein Total 06/04/2020 7.6  6.8 - 8.8 g/dL Final     Alkaline Phosphatase 06/04/2020 68  40 - 150 U/L Final     ALT 06/04/2020 26  0 - 70 U/L Final     AST 06/04/2020 20  0 - 45 U/L Final

## 2020-06-13 ENCOUNTER — NURSE TRIAGE (OUTPATIENT)
Dept: NURSING | Facility: CLINIC | Age: 50
End: 2020-06-13

## 2020-06-13 NOTE — TELEPHONE ENCOUNTER
Patient calling. States he thinks he has an ear infection and would like to see someone.    Denies fever or drainage from ear. States he has pain in his ear and along his jawline. He had a tooth pulled approximately 1 month ago bu has had no other changes to his normal routine.    Called to schedule appointment and was transferred to nurse line for eval.    Protocol and care advice reviewed    Caller states understanding of the recommended disposition. Transferred back to scheduling for in-person appointment.    Advised to call back if further questions or concerns        Additional Information    All other earaches (Exceptions: earache lasting < 1 hour, and earache from air travel)    Negative: White, yellow, or green discharge    Negative: Diabetes mellitus or a weak immune system (e.g., HIV positive, cancer chemotherapy, transplant patient)    Negative: Bloody discharge or unexplained bleeding from ear canal    Negative: New blurred vision or vision changes    Negative: Severe earache pain    Negative: Fever > 103 F (39.4 C)    Negative: Pointed object was inserted into the ear canal (e.g., a pencil, stick, or wire)    Negative: Pink or red swelling behind the ear    Negative: Stiff neck (can't touch chin to chest)    Negative: Patient sounds very sick or weak to the triager    Negative: Sounds like a life-threatening emergency to the triager    Protocols used: EARACHE-A-OH

## 2020-06-14 ENCOUNTER — OFFICE VISIT (OUTPATIENT)
Dept: URGENT CARE | Facility: URGENT CARE | Age: 50
End: 2020-06-14
Payer: COMMERCIAL

## 2020-06-14 VITALS
TEMPERATURE: 98.5 F | HEART RATE: 84 BPM | WEIGHT: 202 LBS | OXYGEN SATURATION: 98 % | DIASTOLIC BLOOD PRESSURE: 79 MMHG | SYSTOLIC BLOOD PRESSURE: 132 MMHG

## 2020-06-14 DIAGNOSIS — K29.70 GASTRITIS WITHOUT BLEEDING, UNSPECIFIED CHRONICITY, UNSPECIFIED GASTRITIS TYPE: Primary | ICD-10-CM

## 2020-06-14 DIAGNOSIS — K08.89 PAIN, DENTAL: ICD-10-CM

## 2020-06-14 PROCEDURE — 99213 OFFICE O/P EST LOW 20 MIN: CPT | Performed by: FAMILY MEDICINE

## 2020-06-14 NOTE — PROGRESS NOTES
"SUBJECTIVE:   Wilber Hamm is a 49 year old male presenting with a chief complaint of   Chief Complaint   Patient presents with     Otalgia     Patient complains of left ear pain        He is an established patient of [facility]      Left ear pain intermittently,  4/10, over the past 2 months. Maxillary second molar extraction 1 month. Will see his dentist next week for \"a cleaning\".     Hx of panic attacks over the past few years. Divorce 3 years ago.   One panic attack in the past 6 months. Fatigue at that time and about 4 months ago.  Hx of epigastric area and pain 2 months ago.   Noted increased peristalsis feeling.       History reviewed. No pertinent past medical history.       Social History     Tobacco Use     Smoking status: Never Smoker     Smokeless tobacco: Never Used   Substance Use Topics     Alcohol use: Not Currently     Drug use: Never      Review of Systems    History reviewed. No pertinent past medical history.  Family History   Problem Relation Age of Onset     Colon Cancer No family hx of      Colon Polyps No family hx of      Current Outpatient Medications   Medication Sig Dispense Refill     clindamycin (CLEOCIN) 300 MG capsule        hydrOXYzine (ATARAX) 25 MG tablet Take 25 mg by mouth 3 times daily as needed       pantoprazole (PROTONIX) 40 MG EC tablet Take 1 tablet (40 mg) by mouth daily for stomach. 90 tablet 0     sucralfate (CARAFATE) 1 GM tablet Take 1 tablet (1 g) by mouth 4 times daily 120 tablet 3     Social History     Tobacco Use     Smoking status: Never Smoker     Smokeless tobacco: Never Used   Substance Use Topics     Alcohol use: Not Currently       OBJECTIVE  /79 (BP Location: Left arm, Patient Position: Chair, Cuff Size: Adult Regular)   Pulse 84   Temp 98.5  F (36.9  C) (Oral)   Wt 91.6 kg (202 lb)   SpO2 98%     Physical Exam  HENT:      Head: Normocephalic and atraumatic.      Comments: TMJ not tender to palp and without crepitus or clicking      Right Ear: " Hospitalist Progress Note


Assessment/Plan: 





# Acute Atrial fibrillation w/ RVR- pt remains in afib - rates with improved 

control ikely triggered by PNA/COPD. [id number] of 3  


   Telemetry (personally reviewed and interpreted) atrial fibrillation Rates  80

-90


   - stopped IV diltiazem drip


   - starting eliquis


   - continue increased home Metoprolol to 75mg BID





# Hyponatremia - drop overnight to 127 - pt with verpoor PO inpatient suspect 

likely a component of hypovolemia -


   - NS bolus now


   - Urine sodium spot


   - recheck BMP this pm





# KENNETH - also suspect related to poor PO intake 


   - NS as above


   - no nephrotoxins on list


   - recheck this pm





# community acquired Pneumonia - with SOB, cough w/ sputum production -CXR- RML/

RLL infiltrates WBC of 12, afebrile.


   - continue levofloxacin PO


   - monitor Blood and sputum cultures 





# Severe COPD - with acute exacerbation - breathing more comfortably today


   Oxygen saturations 96% on 6 L - received Methylprednisolone x1  in ED- 

respiratory pathogen panel negative


   - continue prednisone 40 mg qD


   - Duonebs QID, albuterol PRN


   - will need outpatient pulmonologist





# Suspected dCHF - elevated BNP on admission- received Lasix 20 mg IV x1 in 

emergency department


   - TTE with poor windows - no clear EF estimate





# Acute on chronic HRF - Likely multifactorial from infectin, COPD exacerbation

, AF, and mild pulmonary edema. 


   Uses 3-5 L/min O2 at baseline, currently on 6 L O2. 


   - Acute treatments as above


   - Incentive spirometry, wean O2 as able





# HTN - Continue metoprolol





# Diet - Regular


Code - Full


Ppx - LMWH


Dispo - Admit under inpatient status noting multiple active issues and need for 

further treatment and work-up.





I have discussed the case with the RN-we will focus on bowel regimen today to 

help with nausea and promote better PO





Subjective: nauseated


Objective: 


 Vital Signs











Temp Pulse Resp BP Pulse Ox


 


 36.8 C   120 H  20   148/91 H  91 L


 


 04/16/18 17:54  04/16/18 17:54  04/16/18 17:54  04/16/18 17:54  04/16/18 17:54








 Laboratory Results





 04/15/18 03:46 





 04/16/18 12:05 





 











 04/15/18 04/16/18 04/17/18





 05:59 05:59 05:59


 


Intake Total 1250 200 780


 


Output Total 200 500 800


 


Balance 1050 -300 -20








 











PT  15.4 SEC (12.0-15.0)  H  04/13/18  21:00    


 


INR  1.20  (0.83-1.16)  H  04/13/18  21:00    














- Physical Exam


Constitutional: chronically ill appearing, obese


Eyes: anicteric sclera


Ears, Nose, Mouth, Throat: moist mucous membranes


Cardiovascular: irregularly irregular


Respiratory: reduced air movement, No expiratory wheeze


Gastrointestinal: normoactive bowel sounds


Genitourinary: no bladder fullness


Skin: warm


Musculoskeletal: No asymmetric calves


Neurologic: AAOx3


Psychiatric: interacting appropriately


Lymph, Heme, Immunologic: no cervical LAD





ICD10 Worksheet


Patient Problems: 


 Problems











Problem Status Onset


 


Atrial fibrillation Acute  


 


Pleural effusion Acute  


 


Pneumonia Acute  


 


Shortness of breath Acute  


 


chronic disease mgmt/transitional care Acute Tympanic membrane and external ear normal.      Left Ear: Tympanic membrane and external ear normal.      Ears:      Comments: Minimal clear middle ear effusion left > right      Nose: Nose normal.      Mouth/Throat:      Pharynx: No oropharyngeal exudate.      Comments: Previous extraction maxillary second molar noted.  On the left.  Left mandibular second bicuspid Gracia fracture.  Eyes:      General: No scleral icterus.        Right eye: No discharge.         Left eye: No discharge.      Conjunctiva/sclera: Conjunctivae normal.      Pupils: Pupils are equal, round, and reactive to light.   Neck:      Musculoskeletal: Neck supple.      Thyroid: No thyromegaly.      Trachea: No tracheal deviation.   Cardiovascular:      Rate and Rhythm: Normal rate and regular rhythm.      Heart sounds: Normal heart sounds. No murmur. No friction rub. No gallop.    Pulmonary:      Effort: Pulmonary effort is normal. No respiratory distress.      Breath sounds: Normal breath sounds. No stridor. No wheezing or rales.   Chest:      Chest wall: No tenderness.   Abdominal:      General: Bowel sounds are normal. There is no distension.      Palpations: Abdomen is soft. There is no mass.      Tenderness: There is no abdominal tenderness. There is no guarding or rebound.      Hernia: No hernia is present.   Musculoskeletal:         General: No tenderness or deformity.   Lymphadenopathy:      Cervical: No cervical adenopathy.   Skin:     General: Skin is warm and dry.      Capillary Refill: Capillary refill takes less than 2 seconds.      Findings: No erythema or rash.   Neurological:      General: No focal deficit present.      Mental Status: He is alert and oriented to person, place, and time.      Cranial Nerves: No cranial nerve deficit.   Psychiatric:         Mood and Affect: Mood normal.         Judgment: Judgment normal.      Comments: Does perseverate some.          ASSESSMENT:    ICD-10-CM    1. Gastritis without bleeding,  unspecified chronicity, unspecified gastritis type  K29.70    2. Pain, dental  K08.89         PLAN:  He is due to see his dentist as soon as tomorrow or the following day for follow-up regarding his tooth extraction.  I do feel a lot of his symptoms are related to referred pain from the dental procedures he has had recently.  His ear exam is otherwise unremarkable does have mild effusion clear fluid but not to account for the degree of referred pain to the ear that he is having.  Also is had a for 5-month history of gastritis I do feel he may benefit from gastroenterology referral possible endoscopy.  Admittedly, he tends to worry I do think some of this worry is contributing to his gastritis symptoms.  Continue his pantoprazole as prescribed previously by his physician.  I did offer short-term penicillin to treat him for a possible periapical abscess of his tooth which would account for his pain however he declined at this time as he will see his dentist as soon as tomorrow.  Also did not want to interfere with his upcoming H. pylori test.  The patient indicates understanding of these issues and agrees with the plan.   Ron Chong MD

## 2020-06-25 DIAGNOSIS — R10.13 EPIGASTRIC PAIN: ICD-10-CM

## 2020-06-25 PROCEDURE — 87338 HPYLORI STOOL AG IA: CPT | Performed by: INTERNAL MEDICINE

## 2020-06-26 LAB — H PYLORI AG STL QL IA: NEGATIVE

## 2020-06-30 ENCOUNTER — PATIENT OUTREACH (OUTPATIENT)
Dept: GASTROENTEROLOGY | Facility: CLINIC | Age: 50
End: 2020-06-30

## 2020-07-14 ENCOUNTER — VIRTUAL VISIT (OUTPATIENT)
Dept: FAMILY MEDICINE | Facility: CLINIC | Age: 50
End: 2020-07-14
Payer: COMMERCIAL

## 2020-07-14 DIAGNOSIS — R10.13 DYSPEPSIA: Primary | ICD-10-CM

## 2020-07-14 DIAGNOSIS — F41.9 ANXIETY: ICD-10-CM

## 2020-07-14 DIAGNOSIS — R10.13 EPIGASTRIC PAIN: ICD-10-CM

## 2020-07-14 PROCEDURE — 99214 OFFICE O/P EST MOD 30 MIN: CPT | Mod: 95 | Performed by: FAMILY MEDICINE

## 2020-07-14 RX ORDER — PANTOPRAZOLE SODIUM 40 MG/1
40 TABLET, DELAYED RELEASE ORAL DAILY
Qty: 90 TABLET | Refills: 0 | Status: SHIPPED | OUTPATIENT
Start: 2020-07-14 | End: 2020-07-31

## 2020-07-14 RX ORDER — HYDROXYZINE HYDROCHLORIDE 25 MG/1
25 TABLET, FILM COATED ORAL 3 TIMES DAILY PRN
Qty: 20 TABLET | Refills: 2 | Status: SHIPPED | OUTPATIENT
Start: 2020-07-14

## 2020-07-14 NOTE — PROGRESS NOTES
"Wilber Hamm is a 49 year old male who is being evaluated via a billable telephone visit.      The patient has been notified of following:     \"This telephone visit will be conducted via a call between you and your physician/provider. We have found that certain health care needs can be provided without the need for a physical exam.  This service lets us provide the care you need with a short phone conversation.  If a prescription is necessary we can send it directly to your pharmacy.  If lab work is needed we can place an order for that and you can then stop by our lab to have the test done at a later time.    Telephone visits are billed at different rates depending on your insurance coverage. During this emergency period, for some insurers they may be billed the same as an in-person visit.  Please reach out to your insurance provider with any questions.    If during the course of the call the physician/provider feels a telephone visit is not appropriate, you will not be charged for this service.\"    Patient has given verbal consent for Telephone visit?  Yes    What phone number would you like to be contacted at? [phone]    How would you like to obtain your AVS? Mail a copy    Subjective     Wilber Hamm is a 49 year old male who presents via phone visit today for the following health issues:    HPI   Abdominal Discomfort   Onset: 12-13 weeks     Description:     Burning in chest: no     Intensity: mild    Progression of Symptoms: improving, on a bad day patient has mild to moderate bloating and generalized abdominal discomfort but no real pain anymore per patient. Symptoms have been improving really well these last couple of weeks per pt.     Accompanying Signs & Symptoms:  Does it feel like food gets stuck: no   Nausea: no - has resolved   Vomiting (bloody?): no   Abdominal Pain: YES- mild discomfort   Black-Tarry stools: no :  Bloody stools: no     History:   Previous ulcers: no     Alleviating " factors:  Protonix seems to be the best help    Therapies Tried and outcome:CT was normal and H Pylori test was negative. Carafate and Protonix with relief     Spoke with patient via phone regarding his ongoing abdominal issues.  My first visit with the patient and he has had multiple visits through primary care, urgent care, and recently the emergency department that we reviewed together.  Has had some ongoing significant pain and dyspepsia.  Lab work has been normal including a negative H. pylori.  Was very concerned about this a few weeks ago while in the ER to discuss a dental issue and they performed a CT scan of his abdomen which was negative as well.  Patient has been on Protonix for some time and it is helping.  Had a virtual GI consultation and they placed him on some Carafate as additional therapy and he thinks this has helped as well.  Has a follow-up in 2 to 3 weeks with GI.  But he still somewhat symptomatic though things have improved and this has him a bit worried about what may be causing.  So we discussed his symptomatology and things that may be involved in what might be the next steps in evaluating this.  We discussed the tests that he has had and what they are able to tell us and what they are not.  Patient admits that he is very anxious about the possibility of cancer and we discussed how this might present and how it might be diagnosed.    Reviewed and updated as needed this visit by Provider         Review of Systems   Constitutional, HEENT, cardiovascular, pulmonary, gi and gu systems are negative, except as otherwise noted.       Objective   Reported vitals:  There were no vitals taken for this visit.   healthy, alert and no distress  PSYCH: Alert and oriented times 3; coherent speech, normal   rate and volume, able to articulate logical thoughts, able   to abstract reason, no tangential thoughts, no hallucinations   or delusions  His affect is normal  RESP: No cough, no audible wheezing,  able to talk in full sentences  Remainder of exam unable to be completed due to telephone visits    Diagnostic Test Results:  Labs reviewed in Epic        Assessment/Plan:  1. Dyspepsia  Ongoing dyspepsia has improved with acid blocking therapy.  I discussed with the patient that certainly ulcer or gastritis are a possibility.  I also discussed that we certainly cannot rule out esophageal or gastric mass despite a negative CT scan.  The next step in evaluating this is direct visualization through endoscopy and I think we should try to get this set up prior to his GI follow-up in a couple of weeks.  - GASTROENTEROLOGY ADULT REF PROCEDURE ONLY; Future  - pantoprazole (PROTONIX) 40 MG EC tablet; Take 1 tablet (40 mg) by mouth daily for stomach.  Dispense: 90 tablet; Refill: 0    2. Epigastric pain  Okay to continue his therapy  - pantoprazole (PROTONIX) 40 MG EC tablet; Take 1 tablet (40 mg) by mouth daily for stomach.  Dispense: 90 tablet; Refill: 0    3. Anxiety  Uses on an as-needed basis for unrelated reasons.  Will continue same  - hydrOXYzine (ATARAX) 25 MG tablet; Take 1 tablet (25 mg) by mouth 3 times daily as needed for anxiety  Dispense: 20 tablet; Refill: 2    Return in about 2 weeks (around 7/28/2020) for Based upon test results.      Phone call duration:  23 minutes    Sayda Vera MD

## 2020-07-30 NOTE — PROGRESS NOTES
GI CLINIC VISIT    CC/REFERRING MD:  Andrei Sharp*  REASON FOR CONSULTATION:   Mr. Hamm is a 49 year old male who I was asked to see in consultation at the request of Dr. Andrei Sharp* for   No chief complaint on file.      ASSESSMENT/PLAN:  1. Epigastric pain   H. pylori negative.  Was taking increased amount of NSAIDs in the spring.  Symptoms may be due to gastritis, ulcer, dyspepsia, functional dyspepsia.  Overall symptoms have improved significantly.  Discussed results of recent CT scan, overall reassuring without source of symptoms.  Also has had recent laboratory work-up.  Discussed plan as outlined below.  If persistent symptoms and follow-up, will again discuss EGD at that time however given significant improvement in symptoms can defer.  Plan:   GERD Lifestyle Modifications  -- Avoid foods high in fat or high fructose corn syrup  -- do not eat within three hours of laying down or going to bed  -- raise the head of your bed 6-8 inches  -- avoid alcohol  --  aim for BMI of less than <25 and lose extra weight as needed  -- avoid all Advil or NSAID pain medications - ok to use tylenol (just make sure you are not taking more than recommended dose)  -- continue drinking water-- do not gulp   -- ok to use ginger   -- let us know if you would like to meet with our GI dietition   -- ok to use omeprazole 40 mg   -- can meet with health psychologist     Thank you for this consultation.  It was a pleasure to participate in the care of this patient; please contact us with any further questions.  A total of 61 telephone minutes was spent with this patient, >50% of which was counseling regarding the above delineated issues.    Awilda Christian PA-C  Division of Gastroenterology, Hepatology & Nutrition  [facility]      HPI:   Mr. Hamm is a 49 year old male with prior panic attacks and s/p recent tooth extraction who is referred to GI clinic for follow-up for epigastric pain. He has  "previously been seen by Dr. Weber and this is my first encounter with the patient. Please see her documentation for additional details.     Interval History:   Does note that prior to symptoms, he was doing increased abdominal workouts. Symptoms are 85% normal. Still gets a little borborygmi- diminished considerably.     CT scan 6/27/2020 when seen in the ED for dry socket- no source of epigastric pain.     Has been trying to go on tiptoes and back on heals- this seems to help his symptoms.     Good days are outweighing the bad days, feels like the good days have \"turned the corner\". Has had pain/discomfort on Monday, since then has been symptom-free. Feels like he will have excess gas which hardens stomach.     Had tooth pulled in May- GI symptoms paralleled this. Was on antibiotic before and after this time.      Stools are soft, no straining, does not feel like constipation.     Notes with carafate and PPI together- he had upset stomach. Feels better since quitting carafate. For the last four days, he has not taken anything.     Felt upset stomach in the morning- not for the last month.     From October until now he was using increased advil for sore toe for ice hockey. He was using advil 5 times a week.      Has been eating pot pies, bland foods, fish, grilled chicken. Avoids tomato sauce.     ROS:    Fevers/chills: none  No melena, hematochezia  No diarrhea   Weight fluctuates between 208 and 213- felt like he was losing weight because he did not have an appetite (lowest amount was 205)- has gained a few pounds back   Has gained a few pounds in the last two months   Dizziness spells have improved - has not been bad     PERTINENT PAST MEDICAL HISTORY:  Panic attacks    PREVIOUS SURGERIES:  S/p tooth extraction May 2020      PREVIOUS ENDOSCOPY:  None    ALLERGIES:   No Known Allergies    PERTINENT MEDICATIONS:  Current Outpatient Medications   Medication     hydrOXYzine (ATARAX) 25 MG tablet     pantoprazole " (PROTONIX) 40 MG EC tablet     sucralfate (CARAFATE) 1 GM tablet     No current facility-administered medications for this visit.        SOCIAL HISTORY:  Social History     Socioeconomic History     Marital status: Single     Spouse name: Not on file     Number of children: Not on file     Years of education: Not on file     Highest education level: Not on file   Occupational History     Not on file   Social Needs     Financial resource strain: Not on file     Food insecurity     Worry: Not on file     Inability: Not on file     Transportation needs     Medical: Not on file     Non-medical: Not on file   Tobacco Use     Smoking status: Never Smoker     Smokeless tobacco: Never Used   Substance and Sexual Activity     Alcohol use: Not Currently     Drug use: Never     Sexual activity: Not Currently   Lifestyle     Physical activity     Days per week: Not on file     Minutes per session: Not on file     Stress: Not on file   Relationships     Social connections     Talks on phone: Not on file     Gets together: Not on file     Attends [employer] service: Not on file     Active member of club or organization: Not on file     Attends meetings of clubs or organizations: Not on file     Relationship status: Not on file     Intimate partner violence     Fear of current or ex partner: Not on file     Emotionally abused: Not on file     Physically abused: Not on file     Forced sexual activity: Not on file   Other Topics Concern     Not on file   Social History Narrative     Not on file       FAMILY HISTORY:  Family History   Problem Relation Age of Onset     Colon Cancer No family hx of      Colon Polyps No family hx of        Mother: reportedly healthy, may have had some issues with GERD  Father: DMT2  Siblings: reportedly all healthy    Denies any known GI malignancies, IBD, or other GI diagnoses in the family.    PHYSICAL EXAMINATION:  Vitals There were no vitals taken for this visit.   Wt   Wt Readings from Last 2  Encounters:   06/14/20 91.6 kg (202 lb)   05/28/20 93 kg (205 lb)   General appearance: Healthy appearing adult, in no acute distress  Eyes: Sclera anicteric, Pupils round and reactive to light  Ears, nose, mouth and throat: No obvious external lesions of ears and nose, Hearing intact  Neck: Symmetric, No obvious external lesions  Respiratory: Normal respiration, no use of accessory muscles   Skin: No rashes or jaundice   Psychiatric: Oriented to person, place and time, Appropriate mood and affect.       PERTINENT STUDIES:  Orders Only on 06/04/2020   Component Date Value Ref Range Status     Lipase 06/04/2020 114  73 - 393 U/L Final     WBC 06/04/2020 5.5  4.0 - 11.0 10e9/L Final     RBC Count 06/04/2020 5.06  4.4 - 5.9 10e12/L Final     Hemoglobin 06/04/2020 14.8  13.3 - 17.7 g/dL Final     Hematocrit 06/04/2020 44.3  40.0 - 53.0 % Final     MCV 06/04/2020 88  78 - 100 fl Final     MCH 06/04/2020 29.2  26.5 - 33.0 pg Final     MCHC 06/04/2020 33.4  31.5 - 36.5 g/dL Final     RDW 06/04/2020 12.0  10.0 - 15.0 % Final     Platelet Count 06/04/2020 209  150 - 450 10e9/L Final     % Neutrophils 06/04/2020 60.9  % Final     % Lymphocytes 06/04/2020 27.3  % Final     % Monocytes 06/04/2020 9.8  % Final     % Eosinophils 06/04/2020 1.8  % Final     % Basophils 06/04/2020 0.2  % Final     Absolute Neutrophil 06/04/2020 3.3  1.6 - 8.3 10e9/L Final     Absolute Lymphocytes 06/04/2020 1.5  0.8 - 5.3 10e9/L Final     Absolute Monocytes 06/04/2020 0.5  0.0 - 1.3 10e9/L Final     Absolute Eosinophils 06/04/2020 0.1  0.0 - 0.7 10e9/L Final     Absolute Basophils 06/04/2020 0.0  0.0 - 0.2 10e9/L Final     Diff Method 06/04/2020 Automated Method   Final     Sodium 06/04/2020 138  133 - 144 mmol/L Final     Potassium 06/04/2020 4.1  3.4 - 5.3 mmol/L Final     Chloride 06/04/2020 105  94 - 109 mmol/L Final     Carbon Dioxide 06/04/2020 29  20 - 32 mmol/L Final     Anion Gap 06/04/2020 4  3 - 14 mmol/L Final     Glucose 06/04/2020 90   70 - 99 mg/dL Final     Urea Nitrogen 06/04/2020 16  7 - 30 mg/dL Final     Creatinine 06/04/2020 0.90  0.66 - 1.25 mg/dL Final     GFR Estimate 06/04/2020 >90  >60 mL/min/[1.73_m2] Final     GFR Estimate If Black 06/04/2020 >90  >60 mL/min/[1.73_m2] Final     Calcium 06/04/2020 9.5  8.5 - 10.1 mg/dL Final     Bilirubin Total 06/04/2020 0.7  0.2 - 1.3 mg/dL Final     Albumin 06/04/2020 4.0  3.4 - 5.0 g/dL Final     Protein Total 06/04/2020 7.6  6.8 - 8.8 g/dL Final     Alkaline Phosphatase 06/04/2020 68  40 - 150 U/L Final     ALT 06/04/2020 26  0 - 70 U/L Final     AST 06/04/2020 20  0 - 45 U/L Final

## 2020-07-31 ENCOUNTER — VIRTUAL VISIT (OUTPATIENT)
Dept: GASTROENTEROLOGY | Facility: CLINIC | Age: 50
End: 2020-07-31
Payer: COMMERCIAL

## 2020-07-31 VITALS — WEIGHT: 205 LBS | HEIGHT: 74 IN | BODY MASS INDEX: 26.31 KG/M2

## 2020-07-31 DIAGNOSIS — K21.9 GASTROESOPHAGEAL REFLUX DISEASE, ESOPHAGITIS PRESENCE NOT SPECIFIED: Primary | ICD-10-CM

## 2020-07-31 RX ORDER — OMEPRAZOLE 40 MG/1
40 CAPSULE, DELAYED RELEASE ORAL DAILY
Qty: 30 CAPSULE | Refills: 1 | Status: SHIPPED | OUTPATIENT
Start: 2020-07-31 | End: 2020-12-21

## 2020-07-31 ASSESSMENT — MIFFLIN-ST. JEOR: SCORE: 1874.62

## 2020-07-31 ASSESSMENT — PAIN SCALES - GENERAL: PAINLEVEL: NO PAIN (0)

## 2020-07-31 NOTE — PROGRESS NOTES
"Wilber Hamm is a 49 year old male who is being evaluated via a billable video visit.      The patient has been notified of following:     \"This video visit will be conducted via a call between you and your physician/provider. We have found that certain health care needs can be provided without the need for an in-person physical exam.  This service lets us provide the care you need with a video conversation.  If a prescription is necessary we can send it directly to your pharmacy.  If lab work is needed we can place an order for that and you can then stop by our lab to have the test done at a later time.    Video visits are billed at different rates depending on your insurance coverage.  Please reach out to your insurance provider with any questions.    If during the course of the call the physician/provider feels a video visit is not appropriate, you will not be charged for this service.\"    Patient has given verbal consent for Video visit? Yes  How would you like to obtain your AVS? MyChart    Will anyone else be joining your video visit? No      Video-Visit Details    Type of service:  Video Visit    Video Start Time: 4:06  Video End Time: 5:07    Originating Location (pt. Location): Home    Distant Location (provider location):  [facility]     Platform used for Video Visit: Des Christian PA-C        "

## 2020-07-31 NOTE — PATIENT INSTRUCTIONS
It was a pleasure taking care of you today.  I've included a brief summary of our discussion and care plan from today's visit below.  Please review this information with your primary care provider.  ______________________________________________________________________    My recommendations are summarized as follows:    GERD Lifestyle Modifications  -- Avoid foods high in fat or high fructose corn syrup  -- do not eat within three hours of laying down or going to bed  -- raise the head of your bed 6-8 inches  -- avoid alcohol  --  aim for BMI of less than <25 and lose extra weight as needed    -- avoid all Advil or NSAID pain medications - ok to use tylenol (just make sure you are not taking more than recommended dose)    -- continue drinking water-- do not gulp     -- ok to use ginger     -- let us know if you would like to meet with our GI dietition     -- ok to use omeprazole 40 mg     -- can meet with GI health psychologist     Return to GI Clinic in 4-6 weeks to review your progress.    ______________________________________________________________________    Who do I call with any questions after my visit?  Please be in touch if there are any further questions that arise following today's visit.  There are multiple ways to contact your gastroenterology care team.        During business hours, you may reach a Gastroenterology nurse at [phone]      To schedule or reschedule an appointment, please call [phone].       You can always send a secure message through [employer].  [employer] messages are answered by your nurse or doctor typically within 24 hours.  Please allow extra time on weekends and holidays.        For urgent/emergent questions after business hours, you may reach the on-call GI Fellow by contacting the [facility] at [phone].     How will I get the results of any tests ordered?    You will receive all of your results.  If you have signed up for [facility], any tests ordered  at your visit will be available to you after your physician reviews them.  Typically this takes 1-2 weeks.  If there are urgent results that require a change in your care plan, your physician or nurse will call you to discuss the next steps.      What is [employer]?  [employer] is a secure way for you to access all of your healthcare records from the [facility].  It is a web based computer program, so you can sign on to it from any location.  It also allows you to send secure messages to your care team.  I recommend signing up for [employer] access if you have not already done so and are comfortable with using a computer.      How to I schedule a follow-up visit?  If you did not schedule a follow-up visit today, please call [phone] to schedule a follow-up office visit.        Sincerely,    Awilda Christian PA-C  Division of Gastroenterology, Hepatology & Nutrition  [facility]

## 2020-07-31 NOTE — NURSING NOTE
"Chief Complaint   Patient presents with     RECHECK     follow up       Vitals:    07/31/20 1543   Weight: 93 kg (205 lb)   Height: 1.88 m (6' 2\")       Body mass index is 26.32 kg/m .    Chela Edwards CMA    "

## 2020-07-31 NOTE — LETTER
7/31/2020         RE: Wilber Hamm  [address] [place] [address]        Dear Colleague,    Thank you for referring your patient, Wilber Hamm, to the [facility] IBD [facility]IN[facility]. Please see a copy of my visit note below.    GI CLINIC VISIT    CC/REFERRING MD:  Andrei Sharp*  REASON FOR CONSULTATION:   Mr. Hamm is a 49 year old male who I was asked to see in consultation at the request of Dr. Andrei Sharp* for   No chief complaint on file.      ASSESSMENT/PLAN:  1. Epigastric pain   H. pylori negative.  Was taking increased amount of NSAIDs in the spring.  Symptoms may be due to gastritis, ulcer, dyspepsia, functional dyspepsia.  Overall symptoms have improved significantly.  Discussed results of recent CT scan, overall reassuring without source of symptoms.  Also has had recent laboratory work-up.  Discussed plan as outlined below.  If persistent symptoms and follow-up, will again discuss EGD at that time however given significant improvement in symptoms can defer.  Plan:   GERD Lifestyle Modifications  -- Avoid foods high in fat or high fructose corn syrup  -- do not eat within three hours of laying down or going to bed  -- raise the head of your bed 6-8 inches  -- avoid alcohol  --  aim for BMI of less than <25 and lose extra weight as needed  -- avoid all Advil or NSAID pain medications - ok to use tylenol (just make sure you are not taking more than recommended dose)  -- continue drinking water-- do not gulp   -- ok to use ginger   -- let us know if you would like to meet with our GI dietition   -- ok to use omeprazole 40 mg   -- can meet with health psychologist     Thank you for this consultation.  It was a pleasure to participate in the care of this patient; please contact us with any further questions.  A total of 61 telephone minutes was spent with this patient, >50% of which was counseling regarding the above delineated issues.    Awilda Christian,  "ROBYN  Division of Gastroenterology, Hepatology & Nutrition  [facility]      HPI:   Mr. Hamm is a 49 year old male with prior panic attacks and s/p recent tooth extraction who is referred to GI clinic for follow-up for epigastric pain. He has previously been seen by Dr. Weber and this is my first encounter with the patient. Please see her documentation for additional details.     Interval History:   Does note that prior to symptoms, he was doing increased abdominal workouts. Symptoms are 85% normal. Still gets a little borborygmi- diminished considerably.     CT scan 6/27/2020 when seen in the ED for dry socket- no source of epigastric pain.     Has been trying to go on tiptoes and back on heals- this seems to help his symptoms.     Good days are outweighing the bad days, feels like the good days have \"turned the corner\". Has had pain/discomfort on Monday, since then has been symptom-free. Feels like he will have excess gas which hardens stomach.     Had tooth pulled in May- GI symptoms paralleled this. Was on antibiotic before and after this time.      Stools are soft, no straining, does not feel like constipation.     Notes with carafate and PPI together- he had upset stomach. Feels better since quitting carafate. For the last four days, he has not taken anything.     Felt upset stomach in the morning- not for the last month.     From October until now he was using increased advil for sore toe for ice hockey. He was using advil 5 times a week.      Has been eating pot pies, bland foods, fish, grilled chicken. Avoids tomato sauce.     ROS:    Fevers/chills: none  No melena, hematochezia  No diarrhea   Weight fluctuates between 208 and 213- felt like he was losing weight because he did not have an appetite (lowest amount was 205)- has gained a few pounds back   Has gained a few pounds in the last two months   Dizziness spells have improved - has not been bad     PERTINENT PAST MEDICAL " HISTORY:  Panic attacks    PREVIOUS SURGERIES:  S/p tooth extraction May 2020      PREVIOUS ENDOSCOPY:  None    ALLERGIES:   No Known Allergies    PERTINENT MEDICATIONS:  Current Outpatient Medications   Medication     hydrOXYzine (ATARAX) 25 MG tablet     pantoprazole (PROTONIX) 40 MG EC tablet     sucralfate (CARAFATE) 1 GM tablet     No current facility-administered medications for this visit.        SOCIAL HISTORY:  Social History     Socioeconomic History     Marital status: Single     Spouse name: Not on file     Number of children: Not on file     Years of education: Not on file     Highest education level: Not on file   Occupational History     Not on file   Social Needs     Financial resource strain: Not on file     Food insecurity     Worry: Not on file     Inability: Not on file     Transportation needs     Medical: Not on file     Non-medical: Not on file   Tobacco Use     Smoking status: Never Smoker     Smokeless tobacco: Never Used   Substance and Sexual Activity     Alcohol use: Not Currently     Drug use: Never     Sexual activity: Not Currently   Lifestyle     Physical activity     Days per week: Not on file     Minutes per session: Not on file     Stress: Not on file   Relationships     Social connections     Talks on phone: Not on file     Gets together: Not on file     Attends Hinduism service: Not on file     Active member of club or organization: Not on file     Attends meetings of clubs or organizations: Not on file     Relationship status: Not on file     Intimate partner violence     Fear of current or ex partner: Not on file     Emotionally abused: Not on file     Physically abused: Not on file     Forced sexual activity: Not on file   Other Topics Concern     Not on file   Social History Narrative     Not on file       FAMILY HISTORY:  Family History   Problem Relation Age of Onset     Colon Cancer No family hx of      Colon Polyps No family hx of        Mother: reportedly healthy, may  have had some issues with GERD  Father: DMT2  Siblings: reportedly all healthy    Denies any known GI malignancies, IBD, or other GI diagnoses in the family.    PHYSICAL EXAMINATION:  Vitals There were no vitals taken for this visit.   Wt   Wt Readings from Last 2 Encounters:   06/14/20 91.6 kg (202 lb)   05/28/20 93 kg (205 lb)   General appearance: Healthy appearing adult, in no acute distress  Eyes: Sclera anicteric, Pupils round and reactive to light  Ears, nose, mouth and throat: No obvious external lesions of ears and nose, Hearing intact  Neck: Symmetric, No obvious external lesions  Respiratory: Normal respiration, no use of accessory muscles   Skin: No rashes or jaundice   Psychiatric: Oriented to person, place and time, Appropriate mood and affect.       PERTINENT STUDIES:  Orders Only on 06/04/2020   Component Date Value Ref Range Status     Lipase 06/04/2020 114  73 - 393 U/L Final     WBC 06/04/2020 5.5  4.0 - 11.0 10e9/L Final     RBC Count 06/04/2020 5.06  4.4 - 5.9 10e12/L Final     Hemoglobin 06/04/2020 14.8  13.3 - 17.7 g/dL Final     Hematocrit 06/04/2020 44.3  40.0 - 53.0 % Final     MCV 06/04/2020 88  78 - 100 fl Final     MCH 06/04/2020 29.2  26.5 - 33.0 pg Final     MCHC 06/04/2020 33.4  31.5 - 36.5 g/dL Final     RDW 06/04/2020 12.0  10.0 - 15.0 % Final     Platelet Count 06/04/2020 209  150 - 450 10e9/L Final     % Neutrophils 06/04/2020 60.9  % Final     % Lymphocytes 06/04/2020 27.3  % Final     % Monocytes 06/04/2020 9.8  % Final     % Eosinophils 06/04/2020 1.8  % Final     % Basophils 06/04/2020 0.2  % Final     Absolute Neutrophil 06/04/2020 3.3  1.6 - 8.3 10e9/L Final     Absolute Lymphocytes 06/04/2020 1.5  0.8 - 5.3 10e9/L Final     Absolute Monocytes 06/04/2020 0.5  0.0 - 1.3 10e9/L Final     Absolute Eosinophils 06/04/2020 0.1  0.0 - 0.7 10e9/L Final     Absolute Basophils 06/04/2020 0.0  0.0 - 0.2 10e9/L Final     Diff Method 06/04/2020 Automated Method   Final     Sodium  06/04/2020 138  133 - 144 mmol/L Final     Potassium 06/04/2020 4.1  3.4 - 5.3 mmol/L Final     Chloride 06/04/2020 105  94 - 109 mmol/L Final     Carbon Dioxide 06/04/2020 29  20 - 32 mmol/L Final     Anion Gap 06/04/2020 4  3 - 14 mmol/L Final     Glucose 06/04/2020 90  70 - 99 mg/dL Final     Urea Nitrogen 06/04/2020 16  7 - 30 mg/dL Final     Creatinine 06/04/2020 0.90  0.66 - 1.25 mg/dL Final     GFR Estimate 06/04/2020 >90  >60 mL/min/[1.73_m2] Final     GFR Estimate If Black 06/04/2020 >90  >60 mL/min/[1.73_m2] Final     Calcium 06/04/2020 9.5  8.5 - 10.1 mg/dL Final     Bilirubin Total 06/04/2020 0.7  0.2 - 1.3 mg/dL Final     Albumin 06/04/2020 4.0  3.4 - 5.0 g/dL Final     Protein Total 06/04/2020 7.6  6.8 - 8.8 g/dL Final     Alkaline Phosphatase 06/04/2020 68  40 - 150 U/L Final     ALT 06/04/2020 26  0 - 70 U/L Final     AST 06/04/2020 20  0 - 45 U/L Final             Wilber Hansel is a 49 year old male who is being evaluated via a billable video visit.          Video-Visit Details    Type of service:  Video Visit    Video Start Time: 4:06  Video End Time: 5:07    Originating Location (pt. Location): Home    Distant Location (provider location):  [facility] GASTROENTEROLOGY [facility] IBD CLIN[facility]     Platform used for Video Visit: Des Christian PA-C

## 2020-08-18 ENCOUNTER — OFFICE VISIT (OUTPATIENT)
Dept: FAMILY MEDICINE | Facility: CLINIC | Age: 50
End: 2020-08-18
Payer: COMMERCIAL

## 2020-08-18 VITALS
OXYGEN SATURATION: 99 % | SYSTOLIC BLOOD PRESSURE: 160 MMHG | DIASTOLIC BLOOD PRESSURE: 90 MMHG | HEIGHT: 74 IN | TEMPERATURE: 97.3 F | HEART RATE: 72 BPM | BODY MASS INDEX: 26.54 KG/M2 | WEIGHT: 206.8 LBS

## 2020-08-18 DIAGNOSIS — R10.13 DYSPEPSIA: ICD-10-CM

## 2020-08-18 DIAGNOSIS — R10.13 EPIGASTRIC PAIN: Primary | ICD-10-CM

## 2020-08-18 DIAGNOSIS — I45.10 RIGHT BUNDLE BRANCH BLOCK: ICD-10-CM

## 2020-08-18 DIAGNOSIS — R03.0 ELEVATED BLOOD PRESSURE READING WITHOUT DIAGNOSIS OF HYPERTENSION: ICD-10-CM

## 2020-08-18 PROBLEM — E66.3 OVERWEIGHT WITH BODY MASS INDEX (BMI) OF 26 TO 26.9 IN ADULT: Status: ACTIVE | Noted: 2020-08-18

## 2020-08-18 PROCEDURE — 99214 OFFICE O/P EST MOD 30 MIN: CPT | Performed by: FAMILY MEDICINE

## 2020-08-18 ASSESSMENT — PAIN SCALES - GENERAL: PAINLEVEL: MILD PAIN (2)

## 2020-08-18 ASSESSMENT — MIFFLIN-ST. JEOR: SCORE: 1878.82

## 2020-08-18 NOTE — PROGRESS NOTES
"Alo Hamm is a 49 year old male who presents to clinic today for the following health issues:    HPI       ABDOMINAL   PAIN     Onset: 4/18/20     Description:   Character: gnawing discomfort or pressure, hard to describe  Location: left upper quadrant/left epigastric (80% of the time)  Radiation: None    Intensity: 2/10    Progression of Symptoms:  intermittent    Accompanying Signs & Symptoms:    GERD, heartburn, acid taste reported earlier, resolved.  Only recently, 4 days) has noted some substernal discoomfort after swallowing some foods.  Fever/Chills?: no   Gas/Bloating: YES, occ   Nausea: no   Vomitting: no   Diarrhea?: no   Constipation:no   Dysuria or Hematuria: no    History:   Trauma: no   Previous similar pain: YES   Previous tests done: CT    Precipitating factors:   Does the pain change with:     Food: no      BM: YES    Urination: no     Alleviating factors:  otc     Therapies Tried and outcome: doesn't help     LMP:  not applicable         Past medical, family, and social histories, medications, and allergies are reviewed and updated in [facility].     Review of Systems   Constitutional, HEENT, cardiovascular, pulmonary, gi and gu systems are negative, except as otherwise noted.    OBJECTIVE:   BP (!) 160/89   Pulse 72   Temp 97.3  F (36.3  C) (Tympanic)   Ht 1.873 m (6' 1.75\")   Wt 93.8 kg (206 lb 12.8 oz)   SpO2 99%   BMI 26.73 kg/m    Body mass index is 26.73 kg/m .  Physical Exam   GENERAL: healthy, alert and no distress  EYES: Eyes grossly normal to inspection, PERRL and conjunctivae and sclerae normal  RESP: lungs clear to auscultation - no rales, rhonchi or wheezes  CV: regular rate and rhythm, normal S1 S2, no S3 or S4, no murmur, click or rub, no peripheral edema and peripheral pulses strong  ABDOMEN: soft, nontender, no hepatosplenomegaly, no masses and bowel sounds normal  SKIN: no suspicious lesions or rashes  NEURO: Normal strength and tone, mentation intact and speech " normal  PSYCH: mentation appears normal, affect normal/bright    Diagnostic Test Results:     Ref. Range 5/14/2020 11:01 6/25/2020 12:26   Helicobacter pylori Antigen Stool Latest Ref Range: NEG^Negative  Negative Negative      Ref. Range 4/19/2020 09:42 6/4/2020 12:59   Sodium Latest Ref Range: 133 - 144 mmol/L 138 138   Potassium Latest Ref Range: 3.4 - 5.3 mmol/L 4.2 4.1   Chloride Latest Ref Range: 94 - 109 mmol/L 105 105   Carbon Dioxide Latest Ref Range: 20 - 32 mmol/L 29 29   Urea Nitrogen Latest Ref Range: 7 - 30 mg/dL 17 16   Creatinine Latest Ref Range: 0.66 - 1.25 mg/dL 0.92 0.90   GFR Estimate Latest Ref Range: >60 mL/min/1.73_m2 >90 >90   Calcium Latest Ref Range: 8.5 - 10.1 mg/dL 9.4 9.5   Anion Gap Latest Ref Range: 3 - 14 mmol/L 4 4   Albumin Latest Ref Range: 3.4 - 5.0 g/dL 3.9 4.0   Protein Total Latest Ref Range: 6.8 - 8.8 g/dL 7.4 7.6   Bilirubin Total Latest Ref Range: 0.2 - 1.3 mg/dL 0.7 0.7   Alkaline Phosphatase Latest Ref Range: 40 - 150 U/L 74 68   ALT Latest Ref Range: 0 - 70 U/L 21 26   AST Latest Ref Range: 0 - 45 U/L 20 20   Lipase Latest Ref Range: 73 - 393 U/L 77 114   Glucose Latest Ref Range: 70 - 99 mg/dL 84 90   WBC Latest Ref Range: 4.0 - 11.0 10e9/L 7.0 5.5   Hemoglobin Latest Ref Range: 13.3 - 17.7 g/dL 15.1 14.8   Hematocrit Latest Ref Range: 40.0 - 53.0 % 44.8 44.3   Platelet Count Latest Ref Range: 150 - 450 10e9/L 224 209       ASSESSMENT / PLAN:    ASSESSMENT/PLAN:  (R10.13) Epigastric pain  (primary encounter diagnosis)  (R10.13) Dyspepsia  Comment: Unknown cause, and he has had a rather extensive work-up so far.  Further discussion reveals that he has not been consistent with his PPI, because every time he suspects a new symptom, he will assume it is a side effect and stopped the medication.  He had definite side effects with sucralfate, so he can stop that for now.  Plan: GASTROENTEROLOGY ADULT REF PROCEDURE ONLY        Take PPI consistently. Keep GI appointment for  9/15/20, and ask them about having EGD (which I want him to schedule now for the week of 9/21)  Handouts provided on IBS and colon cancer.    (R03.0) Elevated blood pressure reading without diagnosis of hypertension  Comment:   Plan: Return in about 6 weeks (around 9/29/2020) for full physical, blood pressure check.         Jeffrey Ayala MD

## 2020-08-18 NOTE — PATIENT INSTRUCTIONS
At [facility], we strive to deliver an exceptional experience to you, every time we see you. If you receive a survey, please complete it as we do value your feedback.  If you have MyChart, you can expect to receive results automatically within 24 hours of their completion.  Your provider will send a note interpreting your results as well.   If you do not have MyChart, you should receive your results in about a week by mail.    Your care team:                            Family Medicine Internal Medicine   MD Nicholas Atkins MD Shantel Branch-Fleming, MD Srinivasa Vaka, MD Katya Georgiev PA-C Megan Hill, APRN CNP    Solomon King, MD Pediatrics   Bladimir Galarza, PAHeidiC  Tete Flores, CNP MD Alma Rosa Mora APRN CNP   MD Graciela Pelayo MD Deborah Mielke, MD Effie Simpson, APRN CNP  ROBYN Sharma, CNP  MD Amira King MD Angela Wermerskirchen, MD      Clinic hours: Monday - Thursday 7 am-7 pm; Fridays 7 am-5 pm.   Urgent care: Monday - Friday 11 am-9 pm; Saturday and Sunday 9 am-5 pm.    Clinic: [phone]       [facility]: Monday - Thursday 8 am - 7 pm; Friday 8 am - 6 pm  [facility]: [phone]     Use [email or web address] for 24/7 diagnosis and treatment of dozens of conditions.  Patient Education     What is Irritable Bowel Syndrome (IBS)?     Muscle contraction moves food through the digestive tract.      People who have irritable bowel syndrome (IBS) have digestive tracts that react abnormally to certain substances or to stress. This leads to symptoms like cramps, gas, bloating, pain, constipation, and diarrhea. Sometimes called  spastic colon,  IBS is a common condition that is not a disease, but rather a group of symptoms that happen together.  IBS--a motility problem  The muscle movement that passes food through the digestive  "tract is called motility. When you have IBS, the normal motility of the digestive tract (especially the colon) is disrupted. Motility may speed up, slow down, or become irregular. If stool passes too quickly through the colon, not enough water is absorbed from it. Loose, watery stools (diarrhea) can result. If stool passes through the colon too slowly, too much water is absorbed and the stool becomes hard and dry (constipation). Also, stool and gas may back up and cause painful pressure and cramping. There is no single test that can diagnose IBS. It is a group of symptoms that help your healthcare provider with the diagnosis. Often multiple blood, stool, radiologic tests, or even colonoscopy are performed in the evaluation of people suspected to have IBS. These are done primarily to make sure that there are no other illnesses that can account for your symptoms.   What causes IBS?  A great deal of research has been done on IBS, but the cause is still not known. Some of the possible factors include:     Smoking, eating certain foods, or drinking alcohol or caffeinated drinks can cause, or \"trigger,\" symptoms of IBS.    Although no one knows for sure, IBS may be caused by a problem with the nerves or muscles in your digestive tract.    There is also some evidence that certain bacteria found after a severe gastroinstestinal infection in the small intestine and colon may cause IBS.    While stress and anxiety worsen the symptoms of IBS, it is not believed to be the cause.   What you can do  Recommendations include:    Certain medicines may help regulate the working of your digestive tract. Your healthcare provider may prescribe one or more for you.    Medicine can t cure IBS, but it may help manage the symptoms.    Because some medicines may make IBS worse, don t take any medicine, especially laxatives, unless your healthcare provider prescribes it for you.    Your healthcare provider may suggest some lifestyle changes " to help control your IBS. Two of the most important are changing your diet and managing stress. If diet changes are suggested, ask for nutritional guidance from a dietitian so you maintain a healthy nutritional balance in your food intake.   Date Last Reviewed: 7/1/2016 2000-2019 [employer]. [address] [place] [address]. All rights reserved. This information is not intended as a substitute for professional medical care. Always follow your healthcare professional's instructions.           Patient Education     Irritable Bowel Syndrome    Irritable bowel syndrome (IBS) is a disorder of the intestines. It is not a disease, but a group of symptoms caused by changes in the way the intestines work including how they move, secrete, absorb, and transit pain sensations. It is fairly common, but the cause is not well understood. There are other conditions that cause IBS symptoms, so make sure to speak with your provider to make sure that further evaluation isn't needed.  Symptoms of IBS include:    Belly pain, discomfort, and cramping    Diarrhea    Constipation or dry, hard stools    Mucous stool    Bloating    Feeling of incomplete bowel movements  It usually results in one of 3 patterns of symptoms:    Chronic belly pain and constipation    Recurring episodes of diarrhea, with belly pain or discomfort    Alternating diarrhea and constipation  Home care  The goal of treatment is to control and relieve your symptoms, so you can lead a full and active life. There is no cure for IBS. But it can be managed.  Diet  Your diet did not cause your IBS, but it can affect it. Diet and food choices may cause IBS symptoms in some people, but not everyone. No one diet works for everyone. Finding the best foods for you may take trial and error. Keep a food log to help find what foods made your symptoms worse. Below are some tips that may help you.    Eat more slowly. Eat smaller amounts at a time, but more  often. Remember, you can always eat more, but cannot eat less once you ve eaten too much.    In general, fiber is very helpful for both constipation and diarrhea. However, insoluble fiber can worsen bloating, cramping, and gas. Insoluble fiber can be found in wheat bran, certain vegetables, and whole-grains. Soluble fiber is in such foods as oat bran, barley, nuts, seeds, bean, lentils, peas, and some fruits and vegetables. Increase fiber slowly and choose a product that includes soluble fiber. It helps to keep a food diary and write down the symptoms you have with certain foods.    Try lactose-free dairy products. many people are intolerant to lactose.    Try cutting out foods that are high in fat and fatty meats.    You can control bloating or passing excess gas. Be careful with  gassy  vegetables and fruits like beans, cabbage, broccoli, and cauliflower.  Other foods called FODMAPs are a type of carbohydrate that can cause these symptoms and diarrhea. They are poorly digestible carbohydrates. Some FODMAP examples include honey, apples, pears, nectarines, lima beans, and cabbage. Talk with your provider about how to choose low FODMAP foods if you are sensitive to them.    Be careful with carbonated beverages and fruit juices. They can make your bloating and diarrhea worse.    Caffeine, alcohol, and stimulants can make symptoms worse. These include coffee, tea, sodas, energy drinks, and chocolate.    IBS diet guidelines can be confusing. Ask your provider for a referral to a registered dietitian. This professional can help you make sense of IBS diet suggestions.  Lifestyle    Look for factors that seem to worsen your symptoms. These include stress and emotions.     Although stress does not cause IBS, it may trigger flare-ups. Counseling can help you learn to handle stress. So can self-help measures like exercise, yoga, and meditation.    Depression can occur along with IBS. Your healthcare provider may prescribe  antidepressant medicine. This may help with diarrhea, constipation, and cramping, as well as with symptoms of depression.    Smoking doesn't cause IBS, but can make the symptoms worse.  Medicines  Your healthcare provider may prescribe medicines. Take them as directed. For acute flare-ups of your illness, your provider may give you prescription medicines.    Check with your healthcare provider before taking any medicines for diarrhea.    Don't take anti-inflammatory medicines like ibuprofen or naproxen.    Long-term medicines can be helpful for chronic symptoms    If you have lost enough weight to make you need nutritional supplements, talk to your provider. This may point to another more serious condition.  Follow-up care  Follow up with your healthcare provider, or as advised.  When to seek medical advice  Call your healthcare provider right away if any of these occur:    Belly pain gets worse or does not improve after a bowel movement    Constant belly pain moves to the right-lower belly    You can't keep liquids down because of vomiting    You have severe, persistent diarrhea    You have blood (red or black color) or mucus in your stool    You have lost significant weight    You feel very weak or dizzy, faint, or have extreme thirst    You have a fever of 100.4 F (38.0 C) or higher, or as directed by your healthcare provider  Date Last Reviewed: 6/1/2018 2000-2019 [employer]. [address] [place] [address], [place] [address]. All rights reserved. This information is not intended as a substitute for professional medical care. Always follow your healthcare professional's instructions.           Patient Education     Diet and Lifestyle Tips for Irritable Bowel Syndrome (IBS)    Your healthcare professional may suggest some lifestyle changes to help control your IBS. Changing your diet and managing stress are two of the most important. Follow your healthcare provider s instructions and try some of the  suggestions below.  Change your diet  Your diet may be an important cause of IBS symptoms. You may want to try the following:    Pay attention to what foods bother you, and avoid them. For example, dairy products are hard for some people to digest.    Drink 6 to 8 glasses of water a day.    Avoid caffeine and tobacco. These are muscle stimulants and can affect the working of your digestive tract.    Avoid alcohol, which can irritate your digestive tract and make your symptoms worse.    Eat more fiber if constipation is a problem. Fiber makes the stool softer and easier to pass through the colon.  Reduce stress  If stress or anxiety makes your IBS symptoms worse, learning how to manage stress may help you feel better. Try these tips:    Identify the causes of stress in your life.    Learn new ways to cope with them.    Regular exercise is a great way to relieve stress. It can also help ease constipation.  Date Last Reviewed: 7/1/2016 2000-2019 [employer]. [address] [place] [address]. All rights reserved. This information is not intended as a substitute for professional medical care. Always follow your healthcare professional's instructions.           Patient Education     What Is Colon and Rectal Cancer (Colorectal Cancer)?    Cancer is when cells in the body begin changing and growing out of control. These cells can form lumps of tissue called tumors. Cancer that forms in the colon is called colon cancer. Cancer that forms in the rectum is called rectal cancer. These cancers are similar, so they are sometimes just called colorectal cancer.  Understanding the colon and rectum  The colon and rectum make up the large intestine. It s also called the large bowel. The colon is a muscular tube that forms the last part of the digestive tract. It absorbs water and stores food waste. The colon is about 5 feet long. The rectum is the last 6 inches of the large intestine. The colon and rectum  have a smooth inner lining made of millions of cells. Changes in these cells can lead to growths in the colon that can become cancer.  When the colon lining changes  Changes that happen in the cells that line the colon or rectum include:    Polyps. These are fleshy clumps of tissue that form on the inner lining of the colon or rectum. Small polyps are usually benign (not cancer). But over time, cells in a type of polyp known as an adenomatous polyp (adenoma) can change and become cancer. The longer a polyp is there and the larger a polyp grows, the more likely this is to happen. Over a period of years, polyps can become cancer. These growths should be removed when possible. Removing polyps early may prevent cancer from forming.     Colorectal cancers. These usually start when polyp cells begin growing abnormally. As a cancer tumor grows, it can invade the deeper layers of the colon or rectal wall. In time, cancer can grow beyond the colon or rectum and into nearby organs. It can spread to nearby lymph nodes. The cancer cells can also travel to other parts of the body. This is known as metastasis. The earlier a cancer tumor is removed, the better the chance of preventing its spread.  Treatment choices for colon and rectal cancer  You and your healthcare provider will discuss a treatment plan that s best for your needs. Treatment options can include:    Surgery. This is often done to remove the cancerous parts of the colon and rectum. Some nearby tissue is removed as well. This may include nearby lymph nodes.    Chemotherapy. This may be done in addition to surgery. Or it may be done instead of surgery if the cancer has spread. This therapy uses medicines to attack cancer cells. It is known as systemic therapy. This is because it works throughout the body. It's usually done as an outpatient procedure in a healthcare provider's office, clinic, or hospital. You may take the medicine in pill form. Or you may have it  through an IV line or infusion pump. This is a device that slowly releases medicine into your bloodstream.    Radiation therapy. This may be used for rectal cancer. This treatment uses high-energy X-rays to kill cancer cells. It s known as localized therapy. This is because it targets the specific area of the body affected by the cancer. It is usually done on an outpatient basis in a hospital or radiation clinic.    Targeted therapy. This uses medicines that target proteins or cell functions that help cancer cells grow. Some of these medicines are given along with chemotherapy medicines. Other medicines are used by themselves.    Immunotherapy. This treatment uses medicines that help the body's immune system attack the cancer. These medicines can be helpful in a small percentage of colorectal cancers that have certain gene changes. Immunotherapy might be an option for advanced colorectal cancers with these changes. It may be used after chemotherapy has not worked.   Date Last Reviewed: 12/1/2017 2000-2019 [employer]. [address] [place] [address]. All rights reserved. This information is not intended as a substitute for professional medical care. Always follow your healthcare professional's instructions.           Patient Education     Colorectal Cancer Screening    Colorectal cancer is cancer in the colon or rectum. It is a leading cause of cancer deaths in the [place] But when this cancer is found and removed early, the chances of a full recovery are very good. Because colorectal cancer rarely causes symptoms in its early stages, screening for the disease is important. It s even more crucial if you have risk factors for the disease. Learn more about colorectal cancer and its risk factors. Then talk to your healthcare provider about being screened.  Risk factors for colorectal cancer  Your risk of having colorectal cancer increases if you:    Are 50 years of age or older    Have a family  history or personal history of colorectal cancer or polyps    Have a personal history of type 2 diabetes, Crohn s disease, or ulcerative colitis    Have an inherited genetic syndrome like Hernandez syndrome ([facility]) or familial adenomatous polyposis (FAP)    Are very overweight    Are not physically active    Smoke    Drink a lot of alcohol    Eat a lot of red or processed meat  The colon and rectum  Waste from food you eat enters the colon from the small intestine. As it travels through the colon, the waste (stool) loses water and becomes more solid. Intestinal muscles push it toward the sigmoid colon. This is the last section of the colon. Stool then moves into the rectum, where it s stored until it s ready to leave the body during a bowel movement.  How colorectal cancer starts  Polyps are growths that form on the inner lining of the colon or rectum. Most are benign, which means they aren t cancer. But over time, some polyps can become cancer (malignant). This happens when cells in these polyps begin growing abnormally. In time, malignant cells invade more of the colon and rectum. The cancer may also spread to nearby organs or lymph nodes or to other parts of the body. Finding and removing polyps can help prevent cancer from forming.  Your screening  Screening means looking for a health problem before you have symptoms. During screening for colorectal cancer, your healthcare provider will ask about your health history, examine you, and do 1 or more tests. To start, you may have:    Health history questions to answer. Your healthcare provider will ask about your health history. Mention if a family member has had colon cancer or polyps. Also mention any health problems you have had in the past.    Digital rectal exam (RIKA). During a RIKA, the healthcare provider inserts a lubricated gloved finger into the rectum. The test is painless and takes less than a minute. This test alone is not enough to screen for colorectal  cancer. You will also need one of the below tests.  Screening test choices  Fecal occult blood test (FOBT) or fecal immunochemical test (FIT)  These tests check for blood in stool that you can t see (hidden or occult blood). Hidden blood may be a sign of colon polyps or cancer. A small sample of stool is tested for blood in a laboratory. Most often, you collect this sample at home using a kit your healthcare provider gives you. Follow the instructions carefully for using this kit. You might need to not eat certain foods and not take certain medicines before the test, as directed.  Stool DNA test  This test looks for DNA changes in cells in the stool. These DNA changes might be signs of cancer. It also looks for hidden blood in stool. For this test, you collect an entire bowel movement. This is done using a special container put in the toilet. The sample is then sent to a lab for testing.  Barium enema with contrast (double-contrast barium enema)  This test uses X-rays to create images of the entire colon and rectum. The day before this test, you will need to do a bowel prep to clean out the colon and rectum. A bowel prep is a liquid diet plus strong laxatives or enemas. You will be awake for the test, but you may be given medicine to help you relax. At the start of the test, a healthcare provider who specializes in imaging tests (radiologist) places a soft tube into the rectum. The tube is used to fill the colon with a contrast liquid (barium) and air. This can be uncomfortable for some people. The liquid helps the colon show up clearly on the X-rays. Because the test uses X-rays, it exposes you to a small amount of radiation.  Virtual colonoscopy  This exam is also called a CT colonography. It uses a series of X-ray photographs to create a 3-D view of the colon and rectum. The day before the test, you will need to do a bowel prep to clean out your colon. Your healthcare provider will give you instructions on how to  do this. During the procedure, you will lie on a table that is part of a special X-ray machine called a CT scanner. A small tube will be placed into your rectum to fill the colon and rectum with air. This can be uncomfortable for some people. Then, the table will move into the machine and pictures will be taken of your colon and rectum. A computer will combine these photos to create a 3-D picture. Because the test uses X-rays, it exposes you to a small amount of radiation.  Scope exams  Here are 2 types of scope exams:    Colonoscopy. This test can be used to find and remove polyps anywhere in the colon or rectum. The day before the test, you will do a bowel prep. This is a liquid diet plus a strong laxative solution or an enema. The bowel prep will cleanse your colon. You will be given instructions for this. Just before the test, you are given a medicine to make you sleepy. Then, a long, flexible, lighted tube called a colonoscope is gently inserted into the rectum and guided through the entire colon. Images of the colon are viewed on a video screen. Any polyps that are found are removed and sent to a lab for testing. If a polyp can t be removed, a sample of tissue is taken and the polyp might be removed later during surgery. You will need to bring someone with you to drive you home after this test. Colonoscopy is the only screening test that lets your healthcare provider see the entire colon and rectum. This test also lets your healthcare provider remove any pieces of tissue that need to be looked at by a lab. If something suspicious is found using any other tests, you will likely need a colonoscopy.    Sigmoidoscopy. This test is similar to colonoscopy, but focuses only on the sigmoid colon and rectum. As with colonoscopy, bowel prep must be done the day before this test. It might not need to be as complete as the bowel prep for a colonoscopy. You are awake during the procedure, but you may be given medicine to  help you relax. During the test, the healthcare provider guides a thin, flexible, lighted tube called a sigmoidoscope through your rectum and lower colon. The images are displayed on a video screen. Polyps are removed, if possible, and sent to a lab for testing.     When to call your healthcare provider after a test  Call your healthcare provider if you have any of the following after any screening test:    Bleeding    Fever of 100.4 F (38 C) or higher, or as directed by your healthcare provider    Abdominal pain    Vomiting   Date Last Reviewed: 12/1/2017 2000-2019 [employer]. [address] [place] [address]. All rights reserved. This information is not intended as a substitute for professional medical care. Always follow your healthcare professional's instructions.

## 2020-08-25 DIAGNOSIS — Z11.59 ENCOUNTER FOR SCREENING FOR OTHER VIRAL DISEASES: Primary | ICD-10-CM

## 2020-09-15 ENCOUNTER — VIRTUAL VISIT (OUTPATIENT)
Dept: GASTROENTEROLOGY | Facility: CLINIC | Age: 50
End: 2020-09-15
Payer: COMMERCIAL

## 2020-09-15 ENCOUNTER — TELEPHONE (OUTPATIENT)
Dept: SURGERY | Facility: CLINIC | Age: 50
End: 2020-09-15

## 2020-09-15 VITALS — BODY MASS INDEX: 26.95 KG/M2 | HEIGHT: 74 IN | WEIGHT: 210 LBS

## 2020-09-15 DIAGNOSIS — R10.13 DYSPEPSIA: Primary | ICD-10-CM

## 2020-09-15 ASSESSMENT — PAIN SCALES - GENERAL: PAINLEVEL: NO PAIN (0)

## 2020-09-15 ASSESSMENT — MIFFLIN-ST. JEOR: SCORE: 1897.3

## 2020-09-15 NOTE — TELEPHONE ENCOUNTER
Location: [facility]  Is this a cancellation or reschedule?  Yes, rescheduled from 9/24  The name of the procedure: colonoscopy  Provider scheduled with: Marlen  Date 10/8, Time 11:30am

## 2020-09-15 NOTE — PROGRESS NOTES
"Wilber Hamm is a 49 year old male who is being evaluated via a billable video visit.      The patient has been notified of following:     \"This video visit will be conducted via a call between you and your physician/provider. We have found that certain health care needs can be provided without the need for an in-person physical exam.  This service lets us provide the care you need with a video conversation.  If a prescription is necessary we can send it directly to your pharmacy.  If lab work is needed we can place an order for that and you can then stop by our lab to have the test done at a later time.    Video visits are billed at different rates depending on your insurance coverage.  Please reach out to your insurance provider with any questions.    If during the course of the call the physician/provider feels a video visit is not appropriate, you will not be charged for this service.\"    Patient has given verbal consent for Video visit? Yes  How would you like to obtain your AVS? MyChart  If you are dropped from the video visit, the video invite should be resent to: Text to cell phone: [phone]  Will anyone else be joining your video visit? No      Video-Visit Details    Type of service:  Video Visit    Video Start Time: 11:05 AM  Video End Time: 11:58 AM    Originating Location (pt. Location): Home    Distant Location (provider location):  [facility] (provider's home)    Platform used for Video Visit: Des Christian PA-C    GI CLINIC VISIT    CC/REFERRING MD:  Andrei Sharp*  REASON FOR CONSULTATION:   Mr. Hamm is a 49 year old male who I was asked to see in consultation at the request of Dr. Andrei Sharp* for   Chief Complaint   Patient presents with     RECHECK     6 week follow up.        ASSESSMENT/PLAN:  1. Epigastric pain   H. pylori negative.  Was taking increased amount of NSAIDs in the spring.  Symptoms may be due to gastritis, " ulcer, dyspepsia, functional dyspepsia.  Overall symptoms have improved significantly.  Again discussed results of recent CT scan, laboratory work-up, and symptoms.  Patient is currently scheduled for upper endoscopy next week but is concerned about having this procedure done.  Discussed that since he has had no significant pain in the past 3 weeks since taking omeprazole regularly, would be okay to postpone upper endoscopy.  Patient instructed to continue omeprazole 20 mg on a daily basis and monitor symptoms.  If persistent symptoms, would recommend rescheduling upper endoscopy.  If complete resolution in symptoms, can plan to taper off of proton pump inhibitor and monitor symptoms.  Also discussed that other options moving forward would include potentially tricyclic antidepressant for functional dyspepsia if upper endoscopy is normal.    Plan:   --Call the endoscopy scheduling number and cancel procedure.  --Take omeprazole 40 mg a day on an empty stomach  --Monitor symptoms.  If you still has symptoms despite the omeprazole, can reschedule upper endoscopy  --If you do not have any symptoms while on omeprazole, we can discuss tapering medication at follow-up visit in clinic  --Avoid swallowing extra air through gum, straws, hard candy, carbonated beverages    Thank you for this consultation.  It was a pleasure to participate in the care of this patient; please contact us with any further questions.  A total of 53 telephone minutes was spent with this patient, >50% of which was counseling regarding the above delineated issues.    Awilda Christian PA-C  Division of Gastroenterology, Hepatology & Nutrition  [facility]      HPI:   Mr. Hamm is a 49 year old male with prior panic attacks and s/p recent tooth extraction who is referred to GI clinic for follow-up for epigastric pain. He has previously been seen by Dr. Weber and this is my first encounter with the patient. Please see her documentation  "for additional details.     Interval History:   Does note that prior to symptoms, he was doing increased abdominal workouts. Symptoms are 85% normal. Still gets a little borborygmi- diminished considerably.     CT scan 6/27/2020 when seen in the ED for dry socket- no source of epigastric pain.     Has been trying to go on tiptoes and back on heals- this seems to help his symptoms.     Good days are outweighing the bad days, feels like the good days have \"turned the corner\". Has had pain/discomfort on Monday, since then has been symptom-free. Feels like he will have excess gas which hardens stomach.     Had tooth pulled in May- GI symptoms paralleled this. Was on antibiotic before and after this time.      Stools are soft, no straining, does not feel like constipation.     Notes with carafate and PPI together- he had upset stomach. Feels better since quitting carafate. For the last four days, he has not taken anything.     Felt upset stomach in the morning- not for the last month.     From October until now he was using increased advil for sore toe for ice hockey. He was using advil 5 times a week.      Has been eating pot pies, bland foods, fish, grilled chicken. Avoids tomato sauce.      Interval History 9/15/2020:  Went in to see his primary care provider 8/5/2020. Reports that he was inconsistent with PPI (would take a week at a time, would go off and then try them again).     Also describes after having bowel movement, upper left quadrant would cause pain.     Has been taking omeprazole for three weeks straight- has not felt any upper abdominal pain. No tenderness to palpation.     Current symptoms: reports that he had cramping- felt like a \"lower back cramp\" which has now been gone for a week. Appetite has been weird. States certain days he will not develop hunger pains- will only eat because he has to.      Feels abdominal tightening. Can come and go, mid abdomen. Can randomly come and go. Does have increased " "\"digestive noises\" in abdomen and occasionally throat. Overall this has improved significantly.     Probiotic and janell    No nausea, vomiting, diarrhea. Eructation has improved, no reflux, dysphagia, chest or throat pain. No early satiety.     No NSAIDs.      PERTINENT PAST MEDICAL HISTORY:  Panic attacks    PREVIOUS SURGERIES:  S/p tooth extraction May 2020      PREVIOUS ENDOSCOPY:  None    ALLERGIES:   No Known Allergies    PERTINENT MEDICATIONS:  Current Outpatient Medications   Medication     omeprazole (PRILOSEC) 40 MG DR capsule     hydrOXYzine (ATARAX) 25 MG tablet     No current facility-administered medications for this visit.    Probiotic  Janell  Omega three  Zinc  Cranberry supplement   Vitamin B12  (on average every other day)     SOCIAL HISTORY:  Social History     Socioeconomic History     Marital status: Single     Spouse name: Not on file     Number of children: Not on file     Years of education: Not on file     Highest education level: Not on file   Occupational History     Not on file   Social Needs     Financial resource strain: Not on file     Food insecurity     Worry: Not on file     Inability: Not on file     Transportation needs     Medical: Not on file     Non-medical: Not on file   Tobacco Use     Smoking status: Never Smoker     Smokeless tobacco: Never Used   Substance and Sexual Activity     Alcohol use: Not Currently     Drug use: Never     Sexual activity: Not Currently   Lifestyle     Physical activity     Days per week: Not on file     Minutes per session: Not on file     Stress: Not on file   Relationships     Social connections     Talks on phone: Not on file     Gets together: Not on file     Attends [employer]ism service: Not on file     Active member of club or organization: Not on file     Attends meetings of clubs or organizations: Not on file     Relationship status: Not on file     Intimate partner violence     Fear of current or ex partner: Not on file     Emotionally " "abused: Not on file     Physically abused: Not on file     Forced sexual activity: Not on file   Other Topics Concern     Not on file   Social History Narrative     Not on file       FAMILY HISTORY:  Family History   Problem Relation Age of Onset     Colon Cancer No family hx of      Colon Polyps No family hx of        Mother: reportedly healthy, may have had some issues with GERD  Father: DMT2  Siblings: reportedly all healthy    Denies any known GI malignancies, IBD, or other GI diagnoses in the family.    PHYSICAL EXAMINATION:  Vitals Ht 1.88 m (6' 2\")   Wt 95.3 kg (210 lb)   BMI 26.96 kg/m     Wt   Wt Readings from Last 2 Encounters:   09/15/20 95.3 kg (210 lb)   08/18/20 93.8 kg (206 lb 12.8 oz)   General appearance: Healthy appearing adult, in no acute distress  Eyes: Sclera anicteric, Pupils round and reactive to light  Ears, nose, mouth and throat: No obvious external lesions of ears and nose, Hearing intact  Neck: Symmetric, No obvious external lesions  Respiratory: Normal respiration, no use of accessory muscles   Skin: No rashes or jaundice   Psychiatric: Oriented to person, place and time, Appropriate mood and affect.       PERTINENT STUDIES:  Orders Only on 06/04/2020   Component Date Value Ref Range Status     Lipase 06/04/2020 114  73 - 393 U/L Final     WBC 06/04/2020 5.5  4.0 - 11.0 10e9/L Final     RBC Count 06/04/2020 5.06  4.4 - 5.9 10e12/L Final     Hemoglobin 06/04/2020 14.8  13.3 - 17.7 g/dL Final     Hematocrit 06/04/2020 44.3  40.0 - 53.0 % Final     MCV 06/04/2020 88  78 - 100 fl Final     MCH 06/04/2020 29.2  26.5 - 33.0 pg Final     MCHC 06/04/2020 33.4  31.5 - 36.5 g/dL Final     RDW 06/04/2020 12.0  10.0 - 15.0 % Final     Platelet Count 06/04/2020 209  150 - 450 10e9/L Final     % Neutrophils 06/04/2020 60.9  % Final     % Lymphocytes 06/04/2020 27.3  % Final     % Monocytes 06/04/2020 9.8  % Final     % Eosinophils 06/04/2020 1.8  % Final     % Basophils 06/04/2020 0.2  % Final     " Absolute Neutrophil 06/04/2020 3.3  1.6 - 8.3 10e9/L Final     Absolute Lymphocytes 06/04/2020 1.5  0.8 - 5.3 10e9/L Final     Absolute Monocytes 06/04/2020 0.5  0.0 - 1.3 10e9/L Final     Absolute Eosinophils 06/04/2020 0.1  0.0 - 0.7 10e9/L Final     Absolute Basophils 06/04/2020 0.0  0.0 - 0.2 10e9/L Final     Diff Method 06/04/2020 Automated Method   Final     Sodium 06/04/2020 138  133 - 144 mmol/L Final     Potassium 06/04/2020 4.1  3.4 - 5.3 mmol/L Final     Chloride 06/04/2020 105  94 - 109 mmol/L Final     Carbon Dioxide 06/04/2020 29  20 - 32 mmol/L Final     Anion Gap 06/04/2020 4  3 - 14 mmol/L Final     Glucose 06/04/2020 90  70 - 99 mg/dL Final     Urea Nitrogen 06/04/2020 16  7 - 30 mg/dL Final     Creatinine 06/04/2020 0.90  0.66 - 1.25 mg/dL Final     GFR Estimate 06/04/2020 >90  >60 mL/min/[1.73_m2] Final     GFR Estimate If Black 06/04/2020 >90  >60 mL/min/[1.73_m2] Final     Calcium 06/04/2020 9.5  8.5 - 10.1 mg/dL Final     Bilirubin Total 06/04/2020 0.7  0.2 - 1.3 mg/dL Final     Albumin 06/04/2020 4.0  3.4 - 5.0 g/dL Final     Protein Total 06/04/2020 7.6  6.8 - 8.8 g/dL Final     Alkaline Phosphatase 06/04/2020 68  40 - 150 U/L Final     ALT 06/04/2020 26  0 - 70 U/L Final     AST 06/04/2020 20  0 - 45 U/L Final

## 2020-09-15 NOTE — PATIENT INSTRUCTIONS
It was a pleasure taking care of you today.  I've included a brief summary of our discussion and care plan from today's visit below.  Please review this information with your primary care provider.  ______________________________________________________________________    My recommendations are summarized as follows:    --Call the endoscopy scheduling number and cancel procedure.  --Take omeprazole 40 mg a day on an empty stomach  --Monitor symptoms.  If you still has symptoms despite the omeprazole, can reschedule upper endoscopy  --If you do not have any symptoms while on omeprazole, we can discuss tapering medication at follow-up visit in clinic  --Avoid swallowing extra air through gum, straws, hard candy, carbonated beverages    Return to GI Clinic in 1 months to review your progress.    ______________________________________________________________________    How do I schedule labs, imaging studies, or procedures that were ordered in clinic today?   Labs: To schedule lab appointment at the [facility], use my chart or call [phone]. If you have a [facility] lab closer to home where you are regularly seen you can give them a call.     Procedures: If a colonoscopy, upper endoscopy, breath test, esophageal manometry, or pH impedence was ordered today, our endoscopy team will call you to schedule this. If you have not heard from our endoscopy team within a week, please call [phone] ([facility] [place] [facility] #)  to schedule.     Imaging Studies: If you were scheduled for a CT scan, X-ray, MRI, ultrasound, HIDA scan or other imaging study, please call [phone] to have this scheduled.     Referral: If a referral to another specialty was ordered, expect a phone call or follow instructions above. If you have not heard from anyone regarding your referral in a week, please call our clinic to check the status.     Who do I call with any questions after my visit?  Please be in touch if there are any further questions  that arise following today's visit.  There are multiple ways to contact your gastroenterology care team.        During business hours, you may reach a Gastroenterology nurse at [phone]      To schedule or reschedule an appointment, please call [phone].       You can always send a secure message through One[employer]icle.  One[employer] messages are answered by your nurse or doctor typically within 24 hours.  Please allow extra time on weekends and holidays.        For urgent/emergent questions after business hours, you may reach the on-call GI Fellow by contacting the [facility]  at [phone].     How will I get the results of any tests ordered?    You will receive all of your results.  If you have signed up for [employer], any tests ordered at your visit will be available to you after your physician reviews them.  Typically this takes 1-2 weeks.  If there are urgent results that require a change in your care plan, your physician or nurse will call you to discuss the next steps.      What is OneC[employer]icle?  OneC[employer]icle is a secure way for you to access all of your healthcare records from the [facility].  It is a web based computer program, so you can sign on to it from any location.  It also allows you to send secure messages to your care team.  I recommend signing up for OneC[employer]icle access if you have not already done so and are comfortable with using a computer.      How to I schedule a follow-up visit?  If you did not schedule a follow-up visit today, please call [phone] to schedule a follow-up office visit.      Sincerely,    Awilda Christian PA-C  Division of Gastroenterology, Hepatology & Nutrition  [facility]

## 2020-09-15 NOTE — LETTER
9/15/2020       RE: Wilber Hamm  [address] [place] [address]     Dear Colleague,    Thank you for referring your patient, Wilber Hamm, to the [facility] GASTROENTEROLOGY AND IBD CLINIC at [facility]. Please see a copy of my visit note below.    GI CLINIC VISIT    CC/REFERRING MD:  Andrei Sharp*  REASON FOR CONSULTATION:   Mr. Hamm is a 49 year old male who I was asked to see in consultation at the request of Dr. Andrei Sharp* for   Chief Complaint   Patient presents with     RECHECK     6 week follow up.        ASSESSMENT/PLAN:  1. Epigastric pain   H. pylori negative.  Was taking increased amount of NSAIDs in the spring.  Symptoms may be due to gastritis, ulcer, dyspepsia, functional dyspepsia.  Overall symptoms have improved significantly.  Again discussed results of recent CT scan, laboratory work-up, and symptoms.  Patient is currently scheduled for upper endoscopy next week but is concerned about having this procedure done.  Discussed that since he has had no significant pain in the past 3 weeks since taking omeprazole regularly, would be okay to postpone upper endoscopy.  Patient instructed to continue omeprazole 20 mg on a daily basis and monitor symptoms.  If persistent symptoms, would recommend rescheduling upper endoscopy.  If complete resolution in symptoms, can plan to taper off of proton pump inhibitor and monitor symptoms.  Also discussed that other options moving forward would include potentially tricyclic antidepressant for functional dyspepsia if upper endoscopy is normal.    Plan:   --Call the endoscopy scheduling number and cancel procedure.  --Take omeprazole 40 mg a day on an empty stomach  --Monitor symptoms.  If you still has symptoms despite the omeprazole, can reschedule upper endoscopy  --If you do not have any symptoms while on omeprazole, we can discuss tapering medication at follow-up visit in clinic  --Avoid  "swallowing extra air through gum, straws, hard candy, carbonated beverages    Thank you for this consultation.  It was a pleasure to participate in the care of this patient; please contact us with any further questions.  A total of 53 telephone minutes was spent with this patient, >50% of which was counseling regarding the above delineated issues.    Awilda Christian PA-C  Division of Gastroenterology, Hepatology & Nutrition  [facility]      HPI:   Mr. Hamm is a 49 year old male with prior panic attacks and s/p recent tooth extraction who is referred to GI clinic for follow-up for epigastric pain. He has previously been seen by Dr. Weber and this is my first encounter with the patient. Please see her documentation for additional details.     Interval History:   Does note that prior to symptoms, he was doing increased abdominal workouts. Symptoms are 85% normal. Still gets a little borborygmi- diminished considerably.     CT scan 6/27/2020 when seen in the ED for dry socket- no source of epigastric pain.     Has been trying to go on tiptoes and back on heals- this seems to help his symptoms.     Good days are outweighing the bad days, feels like the good days have \"turned the corner\". Has had pain/discomfort on Monday, since then has been symptom-free. Feels like he will have excess gas which hardens stomach.     Had tooth pulled in May- GI symptoms paralleled this. Was on antibiotic before and after this time.      Stools are soft, no straining, does not feel like constipation.     Notes with carafate and PPI together- he had upset stomach. Feels better since quitting carafate. For the last four days, he has not taken anything.     Felt upset stomach in the morning- not for the last month.     From October until now he was using increased advil for sore toe for ice hockey. He was using advil 5 times a week.      Has been eating pot pies, bland foods, fish, grilled chicken. Avoids tomato sauce.  " "    Interval History 9/15/2020:  Went in to see his primary care provider 8/5/2020. Reports that he was inconsistent with PPI (would take a week at a time, would go off and then try them again).     Also describes after having bowel movement, upper left quadrant would cause pain.     Has been taking omeprazole for three weeks straight- has not felt any upper abdominal pain. No tenderness to palpation.     Current symptoms: reports that he had cramping- felt like a \"lower back cramp\" which has now been gone for a week. Appetite has been weird. States certain days he will not develop hunger pains- will only eat because he has to.      Feels abdominal tightening. Can come and go, mid abdomen. Can randomly come and go. Does have increased \"digestive noises\" in abdomen and occasionally throat. Overall this has improved significantly.     Probiotic and janell    No nausea, vomiting, diarrhea. Eructation has improved, no reflux, dysphagia, chest or throat pain. No early satiety.     No NSAIDs.      PERTINENT PAST MEDICAL HISTORY:  Panic attacks    PREVIOUS SURGERIES:  S/p tooth extraction May 2020    PREVIOUS ENDOSCOPY:  None    ALLERGIES:   No Known Allergies    PERTINENT MEDICATIONS:  Current Outpatient Medications   Medication     omeprazole (PRILOSEC) 40 MG DR capsule     hydrOXYzine (ATARAX) 25 MG tablet     No current facility-administered medications for this visit.    Probiotic  Janell  Omega three  Zinc  Cranberry supplement   Vitamin B12  (on average every other day)     SOCIAL HISTORY:  Social History     Socioeconomic History     Marital status: Single     Spouse name: Not on file     Number of children: Not on file     Years of education: Not on file     Highest education level: Not on file   Occupational History     Not on file   Social Needs     Financial resource strain: Not on file     Food insecurity     Worry: Not on file     Inability: Not on file     Transportation needs     Medical: Not on file     " "Non-medical: Not on file   Tobacco Use     Smoking status: Never Smoker     Smokeless tobacco: Never Used   Substance and Sexual Activity     Alcohol use: Not Currently     Drug use: Never     Sexual activity: Not Currently   Lifestyle     Physical activity     Days per week: Not on file     Minutes per session: Not on file     Stress: Not on file   Relationships     Social connections     Talks on phone: Not on file     Gets together: Not on file     Attends Ca[employer] service: Not on file     Active member of club or organization: Not on file     Attends meetings of clubs or organizations: Not on file     Relationship status: Not on file     Intimate partner violence     Fear of current or ex partner: Not on file     Emotionally abused: Not on file     Physically abused: Not on file     Forced sexual activity: Not on file   Other Topics Concern     Not on file   Social History Narrative     Not on file     FAMILY HISTORY:  Family History   Problem Relation Age of Onset     Colon Cancer No family hx of      Colon Polyps No family hx of      Mother: reportedly healthy, may have had some issues with GERD  Father: DMT2  Siblings: reportedly all healthy    Denies any known GI malignancies, IBD, or other GI diagnoses in the family.    PHYSICAL EXAMINATION:  Vitals Ht 1.88 m (6' 2\")   Wt 95.3 kg (210 lb)   BMI 26.96 kg/m     Wt   Wt Readings from Last 2 Encounters:   09/15/20 95.3 kg (210 lb)   08/18/20 93.8 kg (206 lb 12.8 oz)   General appearance: Healthy appearing adult, in no acute distress  Eyes: Sclera anicteric, Pupils round and reactive to light  Ears, nose, mouth and throat: No obvious external lesions of ears and nose, Hearing intact  Neck: Symmetric, No obvious external lesions  Respiratory: Normal respiration, no use of accessory muscles   Skin: No rashes or jaundice   Psychiatric: Oriented to person, place and time, Appropriate mood and affect.     PERTINENT STUDIES:  Orders Only on 06/04/2020   Component " Date Value Ref Range Status     Lipase 06/04/2020 114  73 - 393 U/L Final     WBC 06/04/2020 5.5  4.0 - 11.0 10e9/L Final     RBC Count 06/04/2020 5.06  4.4 - 5.9 10e12/L Final     Hemoglobin 06/04/2020 14.8  13.3 - 17.7 g/dL Final     Hematocrit 06/04/2020 44.3  40.0 - 53.0 % Final     MCV 06/04/2020 88  78 - 100 fl Final     MCH 06/04/2020 29.2  26.5 - 33.0 pg Final     MCHC 06/04/2020 33.4  31.5 - 36.5 g/dL Final     RDW 06/04/2020 12.0  10.0 - 15.0 % Final     Platelet Count 06/04/2020 209  150 - 450 10e9/L Final     % Neutrophils 06/04/2020 60.9  % Final     % Lymphocytes 06/04/2020 27.3  % Final     % Monocytes 06/04/2020 9.8  % Final     % Eosinophils 06/04/2020 1.8  % Final     % Basophils 06/04/2020 0.2  % Final     Absolute Neutrophil 06/04/2020 3.3  1.6 - 8.3 10e9/L Final     Absolute Lymphocytes 06/04/2020 1.5  0.8 - 5.3 10e9/L Final     Absolute Monocytes 06/04/2020 0.5  0.0 - 1.3 10e9/L Final     Absolute Eosinophils 06/04/2020 0.1  0.0 - 0.7 10e9/L Final     Absolute Basophils 06/04/2020 0.0  0.0 - 0.2 10e9/L Final     Diff Method 06/04/2020 Automated Method   Final     Sodium 06/04/2020 138  133 - 144 mmol/L Final     Potassium 06/04/2020 4.1  3.4 - 5.3 mmol/L Final     Chloride 06/04/2020 105  94 - 109 mmol/L Final     Carbon Dioxide 06/04/2020 29  20 - 32 mmol/L Final     Anion Gap 06/04/2020 4  3 - 14 mmol/L Final     Glucose 06/04/2020 90  70 - 99 mg/dL Final     Urea Nitrogen 06/04/2020 16  7 - 30 mg/dL Final     Creatinine 06/04/2020 0.90  0.66 - 1.25 mg/dL Final     GFR Estimate 06/04/2020 >90  >60 mL/min/[1.73_m2] Final     GFR Estimate If Black 06/04/2020 >90  >60 mL/min/[1.73_m2] Final     Calcium 06/04/2020 9.5  8.5 - 10.1 mg/dL Final     Bilirubin Total 06/04/2020 0.7  0.2 - 1.3 mg/dL Final     Albumin 06/04/2020 4.0  3.4 - 5.0 g/dL Final     Protein Total 06/04/2020 7.6  6.8 - 8.8 g/dL Final     Alkaline Phosphatase 06/04/2020 68  40 - 150 U/L Final     ALT 06/04/2020 26  0 - 70 U/L  Final     AST 06/04/2020 20  0 - 45 U/L Final       Again, thank you for allowing me to participate in the care of your patient.  Sincerely,    Awilda Christian PA-C

## 2020-09-15 NOTE — LETTER
"    9/15/2020         RE: Wilber Hamm  [address] [place] [address]        Dear Colleague,    Thank you for referring your patient, Wilber Hamm, to the [facility] GASTROENTEROLOGY AND IBD CLINIC. Please see a copy of my visit note below.    Wilber Hamm is a 49 year old male who is being evaluated via a billable video visit.      The patient has been notified of following:     \"This video visit will be conducted via a call between you and your physician/provider. We have found that certain health care needs can be provided without the need for an in-person physical exam.  This service lets us provide the care you need with a video conversation.  If a prescription is necessary we can send it directly to your pharmacy.  If lab work is needed we can place an order for that and you can then stop by our lab to have the test done at a later time.    Video visits are billed at different rates depending on your insurance coverage.  Please reach out to your insurance provider with any questions.    If during the course of the call the physician/provider feels a video visit is not appropriate, you will not be charged for this service.\"    Patient has given verbal consent for Video visit? Yes  How would you like to obtain your AVS? MyChart  If you are dropped from the video visit, the video invite should be resent to: Text to cell phone: [phone]  Will anyone else be joining your video visit? No      Video-Visit Details    Type of service:  Video Visit    Video Start Time: 11:05 AM  Video End Time: 11:58 AM    Originating Location (pt. Location): Home    Distant Location (provider location):  [facility] GASTROENTEROLOGY AND IBD CLINIC (provider's home)    Platform used for Video Visit: Des Christian PA-C    GI CLINIC VISIT    CC/REFERRING MD:  Andrei Sharp*  REASON FOR CONSULTATION:   Mr. Hamm is a 49 year old male who I was asked to see in consultation at the request of Dr." Andrei Sharp* for   Chief Complaint   Patient presents with     RECHECK     6 week follow up.        ASSESSMENT/PLAN:  1. Epigastric pain   H. pylori negative.  Was taking increased amount of NSAIDs in the spring.  Symptoms may be due to gastritis, ulcer, dyspepsia, functional dyspepsia.  Overall symptoms have improved significantly.  Again discussed results of recent CT scan, laboratory work-up, and symptoms.  Patient is currently scheduled for upper endoscopy next week but is concerned about having this procedure done.  Discussed that since he has had no significant pain in the past 3 weeks since taking omeprazole regularly, would be okay to postpone upper endoscopy.  Patient instructed to continue omeprazole 20 mg on a daily basis and monitor symptoms.  If persistent symptoms, would recommend rescheduling upper endoscopy.  If complete resolution in symptoms, can plan to taper off of proton pump inhibitor and monitor symptoms.  Also discussed that other options moving forward would include potentially tricyclic antidepressant for functional dyspepsia if upper endoscopy is normal.    Plan:   --Call the endoscopy scheduling number and cancel procedure.  --Take omeprazole 40 mg a day on an empty stomach  --Monitor symptoms.  If you still has symptoms despite the omeprazole, can reschedule upper endoscopy  --If you do not have any symptoms while on omeprazole, we can discuss tapering medication at follow-up visit in clinic  --Avoid swallowing extra air through gum, straws, hard candy, carbonated beverages    Thank you for this consultation.  It was a pleasure to participate in the care of this patient; please contact us with any further questions.  A total of 53 telephone minutes was spent with this patient, >50% of which was counseling regarding the above delineated issues.    Awilda Christian PA-C  Division of Gastroenterology, Hepatology & Nutrition  [facility]      HPI:   Mr. Hamm is a  "49 year old male with prior panic attacks and s/p recent tooth extraction who is referred to GI clinic for follow-up for epigastric pain. He has previously been seen by Dr. Weber and this is my first encounter with the patient. Please see her documentation for additional details.     Interval History:   Does note that prior to symptoms, he was doing increased abdominal workouts. Symptoms are 85% normal. Still gets a little borborygmi- diminished considerably.     CT scan 6/27/2020 when seen in the ED for dry socket- no source of epigastric pain.     Has been trying to go on tiptoes and back on heals- this seems to help his symptoms.     Good days are outweighing the bad days, feels like the good days have \"turned the corner\". Has had pain/discomfort on Monday, since then has been symptom-free. Feels like he will have excess gas which hardens stomach.     Had tooth pulled in May- GI symptoms paralleled this. Was on antibiotic before and after this time.      Stools are soft, no straining, does not feel like constipation.     Notes with carafate and PPI together- he had upset stomach. Feels better since quitting carafate. For the last four days, he has not taken anything.     Felt upset stomach in the morning- not for the last month.     From October until now he was using increased advil for sore toe for ice hockey. He was using advil 5 times a week.      Has been eating pot pies, bland foods, fish, grilled chicken. Avoids tomato sauce.      Interval History 9/15/2020:  Went in to see his primary care provider 8/5/2020. Reports that he was inconsistent with PPI (would take a week at a time, would go off and then try them again).     Also describes after having bowel movement, upper left quadrant would cause pain.     Has been taking omeprazole for three weeks straight- has not felt any upper abdominal pain. No tenderness to palpation.     Current symptoms: reports that he had cramping- felt like a \"lower back " "cramp\" which has now been gone for a week. Appetite has been weird. States certain days he will not develop hunger pains- will only eat because he has to.      Feels abdominal tightening. Can come and go, mid abdomen. Can randomly come and go. Does have increased \"digestive noises\" in abdomen and occasionally throat. Overall this has improved significantly.     Probiotic and janell    No nausea, vomiting, diarrhea. Eructation has improved, no reflux, dysphagia, chest or throat pain. No early satiety.     No NSAIDs.      PERTINENT PAST MEDICAL HISTORY:  Panic attacks    PREVIOUS SURGERIES:  S/p tooth extraction May 2020      PREVIOUS ENDOSCOPY:  None    ALLERGIES:   No Known Allergies    PERTINENT MEDICATIONS:  Current Outpatient Medications   Medication     omeprazole (PRILOSEC) 40 MG DR capsule     hydrOXYzine (ATARAX) 25 MG tablet     No current facility-administered medications for this visit.    Probiotic  Janell  Omega three  Zinc  Cranberry supplement   Vitamin B12  (on average every other day)     SOCIAL HISTORY:  Social History     Socioeconomic History     Marital status: Single     Spouse name: Not on file     Number of children: Not on file     Years of education: Not on file     Highest education level: Not on file   Occupational History     Not on file   Social Needs     Financial resource strain: Not on file     Food insecurity     Worry: Not on file     Inability: Not on file     Transportation needs     Medical: Not on file     Non-medical: Not on file   Tobacco Use     Smoking status: Never Smoker     Smokeless tobacco: Never Used   Substance and Sexual Activity     Alcohol use: Not Currently     Drug use: Never     Sexual activity: Not Currently   Lifestyle     Physical activity     Days per week: Not on file     Minutes per session: Not on file     Stress: Not on file   Relationships     Social connections     Talks on phone: Not on file     Gets together: Not on file     Attends Jehovah's witness " "service: Not on file     Active member of club or organization: Not on file     Attends meetings of clubs or organizations: Not on file     Relationship status: Not on file     Intimate partner violence     Fear of current or ex partner: Not on file     Emotionally abused: Not on file     Physically abused: Not on file     Forced sexual activity: Not on file   Other Topics Concern     Not on file   Social History Narrative     Not on file       FAMILY HISTORY:  Family History   Problem Relation Age of Onset     Colon Cancer No family hx of      Colon Polyps No family hx of        Mother: reportedly healthy, may have had some issues with GERD  Father: DMT2  Siblings: reportedly all healthy    Denies any known GI malignancies, IBD, or other GI diagnoses in the family.    PHYSICAL EXAMINATION:  Vitals Ht 1.88 m (6' 2\")   Wt 95.3 kg (210 lb)   BMI 26.96 kg/m     Wt   Wt Readings from Last 2 Encounters:   09/15/20 95.3 kg (210 lb)   08/18/20 93.8 kg (206 lb 12.8 oz)   General appearance: Healthy appearing adult, in no acute distress  Eyes: Sclera anicteric, Pupils round and reactive to light  Ears, nose, mouth and throat: No obvious external lesions of ears and nose, Hearing intact  Neck: Symmetric, No obvious external lesions  Respiratory: Normal respiration, no use of accessory muscles   Skin: No rashes or jaundice   Psychiatric: Oriented to person, place and time, Appropriate mood and affect.       PERTINENT STUDIES:  Orders Only on 06/04/2020   Component Date Value Ref Range Status     Lipase 06/04/2020 114  73 - 393 U/L Final     WBC 06/04/2020 5.5  4.0 - 11.0 10e9/L Final     RBC Count 06/04/2020 5.06  4.4 - 5.9 10e12/L Final     Hemoglobin 06/04/2020 14.8  13.3 - 17.7 g/dL Final     Hematocrit 06/04/2020 44.3  40.0 - 53.0 % Final     MCV 06/04/2020 88  78 - 100 fl Final     MCH 06/04/2020 29.2  26.5 - 33.0 pg Final     MCHC 06/04/2020 33.4  31.5 - 36.5 g/dL Final     RDW 06/04/2020 12.0  10.0 - 15.0 % Final     " Platelet Count 06/04/2020 209  150 - 450 10e9/L Final     % Neutrophils 06/04/2020 60.9  % Final     % Lymphocytes 06/04/2020 27.3  % Final     % Monocytes 06/04/2020 9.8  % Final     % Eosinophils 06/04/2020 1.8  % Final     % Basophils 06/04/2020 0.2  % Final     Absolute Neutrophil 06/04/2020 3.3  1.6 - 8.3 10e9/L Final     Absolute Lymphocytes 06/04/2020 1.5  0.8 - 5.3 10e9/L Final     Absolute Monocytes 06/04/2020 0.5  0.0 - 1.3 10e9/L Final     Absolute Eosinophils 06/04/2020 0.1  0.0 - 0.7 10e9/L Final     Absolute Basophils 06/04/2020 0.0  0.0 - 0.2 10e9/L Final     Diff Method 06/04/2020 Automated Method   Final     Sodium 06/04/2020 138  133 - 144 mmol/L Final     Potassium 06/04/2020 4.1  3.4 - 5.3 mmol/L Final     Chloride 06/04/2020 105  94 - 109 mmol/L Final     Carbon Dioxide 06/04/2020 29  20 - 32 mmol/L Final     Anion Gap 06/04/2020 4  3 - 14 mmol/L Final     Glucose 06/04/2020 90  70 - 99 mg/dL Final     Urea Nitrogen 06/04/2020 16  7 - 30 mg/dL Final     Creatinine 06/04/2020 0.90  0.66 - 1.25 mg/dL Final     GFR Estimate 06/04/2020 >90  >60 mL/min/[1.73_m2] Final     GFR Estimate If Black 06/04/2020 >90  >60 mL/min/[1.73_m2] Final     Calcium 06/04/2020 9.5  8.5 - 10.1 mg/dL Final     Bilirubin Total 06/04/2020 0.7  0.2 - 1.3 mg/dL Final     Albumin 06/04/2020 4.0  3.4 - 5.0 g/dL Final     Protein Total 06/04/2020 7.6  6.8 - 8.8 g/dL Final     Alkaline Phosphatase 06/04/2020 68  40 - 150 U/L Final     ALT 06/04/2020 26  0 - 70 U/L Final     AST 06/04/2020 20  0 - 45 U/L Final               Again, thank you for allowing me to participate in the care of your patient.        Sincerely,        Awilda Christian PA-C

## 2020-09-16 ENCOUNTER — TELEPHONE (OUTPATIENT)
Dept: GASTROENTEROLOGY | Facility: CLINIC | Age: 50
End: 2020-09-16

## 2020-10-16 ENCOUNTER — VIRTUAL VISIT (OUTPATIENT)
Dept: GASTROENTEROLOGY | Facility: CLINIC | Age: 50
End: 2020-10-16
Payer: COMMERCIAL

## 2020-10-16 VITALS — WEIGHT: 211 LBS | BODY MASS INDEX: 27.08 KG/M2 | HEIGHT: 74 IN

## 2020-10-16 DIAGNOSIS — R10.13 EPIGASTRIC PAIN: ICD-10-CM

## 2020-10-16 DIAGNOSIS — R14.0 ABDOMINAL BLOATING: Primary | ICD-10-CM

## 2020-10-16 PROCEDURE — 99215 OFFICE O/P EST HI 40 MIN: CPT | Mod: 95 | Performed by: PHYSICIAN ASSISTANT

## 2020-10-16 ASSESSMENT — PAIN SCALES - GENERAL: PAINLEVEL: NO PAIN (0)

## 2020-10-16 ASSESSMENT — MIFFLIN-ST. JEOR
SCORE: 1897.3
SCORE: 1901.84

## 2020-10-16 NOTE — NURSING NOTE
"Chief Complaint   Patient presents with     RECHECK     4 week follow up       Vitals:    10/16/20 0830   Weight: 95.7 kg (211 lb)   Height: 1.88 m (6' 2\")       Body mass index is 27.09 kg/m .      Jean Marie Lizama LPN                        "

## 2020-10-16 NOTE — LETTER
10/16/2020       RE: Wilber Hamm  [address] [facility]  [place] [address]     Dear Colleague,    Thank you for referring your patient, Wilber Hamm, to the [facility]STRO[facility]OLOG[facility]INIC [place] at [facility]. Please see a copy of my visit note below.    GI CLINIC VISIT    CC/REFERRING MD:  Andrei Sharp  REASON FOR CONSULTATION:   Mr. Hamm is a 49 year old male who I was asked to see in consultation at the request of Dr. Andrei Sharp* for   Chief Complaint   Patient presents with     RECHECK     4 week follow up       ASSESSMENT/PLAN:  1. Epigastric pain   Onset of epigastric pain and bloating Spring 2020 in setting of longterm NSAID use with augmented use preceding symptom onset. H pylori was negative by stool testing x2. CT AP, CBC, CMP, lipase were unremarkable. He was started on PPI with variable compliance, however eventually took a 5-week course of omeprazole 40 mg with resolution of epigastric pain. The epigastric tightness, bloating has remained without improvement, without worsening postprandially or with bowel movements. No clear triggers for symptoms. Bloating can also occur over the generalized abdomen.    Given partial response to PPI and patient preference, will proceed with upper endoscopy, as already scheduled. Differential includes functional dyspepsia/epigastric pain syndrome, PUD, celiac, backlog of stool. He does not carry any risk factors for SIBO. He has no red flag symptoms at this time.      -- Proceed with EGD as scheduled  -- Start trial of low-dose fiber supplementation  -- Can trial FDGard  -- Future considerations include trial of neuromodulation pending the above.    Follow-up in ~3 months    Anastasiia Contreras PA-C    Division of Gastroenterology, Hepatology & Nutrition  [facility]    Scribe Disclosure:   Anastasiia ALCANTARA PA-C, am serving as a scribe; to document services personally  performed by Awilda Christian PA-C -based on data collection and the provider's statements to me.      Provider Disclosure:  I agree with above History, Review of Systems, Physical exam and Plan.  I have reviewed the content of the documentation and have edited it as needed. I have personally performed the services documented here and the documentation accurately represents those services and the decisions I have made.      Awilda Christian PA-C  Division of Gastroenterology, Hepatology & Nutrition  [facility]    HPI:   Mr. Hamm is a 49 year old male with prior panic attacks and s/p recent tooth extraction who is referred to GI clinic for follow-up for epigastric pain. He has previously been seen by Dr. Weber.     Wilber noted onset of epigastric pain and bloating Spring 2020 in setting of longterm NSAID use with augmented use preceding symptom onset. H pylori was negative by stool testing x2. CBC, CMP, lipase were unremarkable.  CT scan 6/27/2020 when seen in the ED for dry socket- no source of epigastric pain. His symptoms were in parallel with a tooth extraction with resultant antibiotic use. He was prescribed PPI but did not take daily. He contnued to endorse upper abdominal tightness, borborygmi.     Interval history 10/16/2020:  Wilber reports that he took the omeprazole 40 mg daily for 5 weeks. [place] through that course, the epigastric/LUQ pain completely resolved. He continued to have a constant sensation of bloating, tightness, predominatnely in the upper abdomen, but can occur anywhere in the generalized abdomen. This did not improve on PPI. He has now been off PPI for 3 weeks without recurrence of pain, but with no change in bloating, tightness. These symptoms are not worsened in postprandial state and are grossly unchanged with bowel movements. Appetite comes and goes but able to complete meals, no early satiety. He has regained the weight that he initially lost at symptom onset. He  denies any heartburn, reflux, odynophagia, dysphagia. Bowel pattern is reportedly at his baseline, 1-2 formed stools daily, rare missed days, no BRBPR or melena, no straining,no hard stools. Does sometimes feel incomplete evacuation, usually then followed by an additional bowel movement. No nausea, vomiting, increased flatulence. He chronically has a sore low back, tightness, which is unchanged. Otherwise, no new symptoms.    PERTINENT PAST MEDICAL HISTORY:  Panic attacks    PREVIOUS SURGERIES:  S/p tooth extraction May 2020    PREVIOUS ENDOSCOPY:  None    ALLERGIES:   No Known Allergies    PERTINENT MEDICATIONS:  Current Outpatient Medications   Medication     hydrOXYzine (ATARAX) 25 MG tablet     omeprazole (PRILOSEC) 40 MG DR capsule     No current facility-administered medications for this visit.    Probiotic  Janell  Omega three  Zinc  Cranberry supplement   Vitamin B12  (on average every other day)     SOCIAL HISTORY:  Social History     Socioeconomic History     Marital status: Single     Spouse name: Not on file     Number of children: Not on file     Years of education: Not on file     Highest education level: Not on file   Occupational History     Not on file   Social Needs     Financial resource strain: Not on file     Food insecurity     Worry: Not on file     Inability: Not on file     Transportation needs     Medical: Not on file     Non-medical: Not on file   Tobacco Use     Smoking status: Never Smoker     Smokeless tobacco: Never Used   Substance and Sexual Activity     Alcohol use: Not Currently     Drug use: Never     Sexual activity: Not Currently   Lifestyle     Physical activity     Days per week: Not on file     Minutes per session: Not on file     Stress: Not on file   Relationships     Social connections     Talks on phone: Not on file     Gets together: Not on file     Attends Orthodoxy service: Not on file     Active member of club or organization: Not on file     Attends meetings of clubs  "or organizations: Not on file     Relationship status: Not on file     Intimate partner violence     Fear of current or ex partner: Not on file     Emotionally abused: Not on file     Physically abused: Not on file     Forced sexual activity: Not on file   Other Topics Concern     Not on file   Social History Narrative     Not on file     FAMILY HISTORY:  Family History   Problem Relation Age of Onset     Colon Cancer No family hx of      Colon Polyps No family hx of      Crohn's Disease No family hx of      Ulcerative Colitis No family hx of      Stomach Cancer No family hx of      Mother: reportedly healthy, may have had some issues with GERD  Father: DMT2  Siblings: reportedly all healthy    Denies any known GI malignancies, IBD, or other GI diagnoses in the family.    PHYSICAL EXAMINATION:  Vitals Ht 1.88 m (6' 2\")   Wt 95.7 kg (211 lb)   BMI 27.09 kg/m     Wt   Wt Readings from Last 2 Encounters:   10/16/20 95.7 kg (211 lb)   09/15/20 95.3 kg (210 lb)   Video physical exam  General: Patient appears well in no acute distress. Normal body habitus.  Skin: No visualized rash or lesions on visualized skin  Eyes: EOMI, no erythema, sclera icterus or discharge noted  Resp: Appears to be breathing comfortably without accessory muscle usage, speaking in full sentences, no cough  MSK: Appears to have normal range of motion based on visualized movements  Neurologic: No apparent tremors, facial movements symmetric  Psych: affect normal, alert and oriented    The rest of a comprehensive physical examination is deferred due to PHE (public health emergency) video restrictions    PERTINENT STUDIES:  Orders Only on 06/04/2020   Component Date Value Ref Range Status     Lipase 06/04/2020 114  73 - 393 U/L Final     WBC 06/04/2020 5.5  4.0 - 11.0 10e9/L Final     RBC Count 06/04/2020 5.06  4.4 - 5.9 10e12/L Final     Hemoglobin 06/04/2020 14.8  13.3 - 17.7 g/dL Final     Hematocrit 06/04/2020 44.3  40.0 - 53.0 % Final     MCV " 06/04/2020 88  78 - 100 fl Final     MCH 06/04/2020 29.2  26.5 - 33.0 pg Final     MCHC 06/04/2020 33.4  31.5 - 36.5 g/dL Final     RDW 06/04/2020 12.0  10.0 - 15.0 % Final     Platelet Count 06/04/2020 209  150 - 450 10e9/L Final     % Neutrophils 06/04/2020 60.9  % Final     % Lymphocytes 06/04/2020 27.3  % Final     % Monocytes 06/04/2020 9.8  % Final     % Eosinophils 06/04/2020 1.8  % Final     % Basophils 06/04/2020 0.2  % Final     Absolute Neutrophil 06/04/2020 3.3  1.6 - 8.3 10e9/L Final     Absolute Lymphocytes 06/04/2020 1.5  0.8 - 5.3 10e9/L Final     Absolute Monocytes 06/04/2020 0.5  0.0 - 1.3 10e9/L Final     Absolute Eosinophils 06/04/2020 0.1  0.0 - 0.7 10e9/L Final     Absolute Basophils 06/04/2020 0.0  0.0 - 0.2 10e9/L Final     Diff Method 06/04/2020 Automated Method   Final     Sodium 06/04/2020 138  133 - 144 mmol/L Final     Potassium 06/04/2020 4.1  3.4 - 5.3 mmol/L Final     Chloride 06/04/2020 105  94 - 109 mmol/L Final     Carbon Dioxide 06/04/2020 29  20 - 32 mmol/L Final     Anion Gap 06/04/2020 4  3 - 14 mmol/L Final     Glucose 06/04/2020 90  70 - 99 mg/dL Final     Urea Nitrogen 06/04/2020 16  7 - 30 mg/dL Final     Creatinine 06/04/2020 0.90  0.66 - 1.25 mg/dL Final     GFR Estimate 06/04/2020 >90  >60 mL/min/[1.73_m2] Final     GFR Estimate If Black 06/04/2020 >90  >60 mL/min/[1.73_m2] Final     Calcium 06/04/2020 9.5  8.5 - 10.1 mg/dL Final     Bilirubin Total 06/04/2020 0.7  0.2 - 1.3 mg/dL Final     Albumin 06/04/2020 4.0  3.4 - 5.0 g/dL Final     Protein Total 06/04/2020 7.6  6.8 - 8.8 g/dL Final     Alkaline Phosphatase 06/04/2020 68  40 - 150 U/L Final     ALT 06/04/2020 26  0 - 70 U/L Final     AST 06/04/2020 20  0 - 45 U/L Final     Again, thank you for allowing me to participate in the care of your patient.  Sincerely,    Awilda Christian PA-C

## 2020-10-16 NOTE — PATIENT INSTRUCTIONS
It was a pleasure taking care of you today.  I've included a brief summary of our discussion and care plan from today's visit below.  Please review this information with your primary care provider.  _______________________________________________________________________    My recommendations are summarized as follows:    -- Can start low-dose fiber supplementation (ie Citrucel, metamucil), starting with 1 teaspoon daily. This can cause some increased bloating in the first 1-2 weeks, which subsides over time as your GI tract adjusts to the increased fiber. A fair trial of fiber is 3-6 months.  -- Can try FDgard 1-2 capsules twice daily before meals   -- Agree with the upper endoscopy, as scheduled.    Return to GI Clinic in about 3 months to review your progress.    _______________________________________________________________________    Who do I call with any questions after my visit?  Please be in touch if there are any further questions that arise following today's visit.  There are multiple ways to contact your gastroenterology care team.        During business hours, you may reach a Gastroenterology nurse at [phone] and choose option 3.         To schedule or reschedule an appointment, please call [phone].       You can always send a secure message through [employer].  [employer] messages are answered by your nurse or doctor typically within 24 hours.  Please allow extra time on weekends and holidays.        For urgent/emergent questions after business hours, you may reach the on-call GI Fellow by contacting the [facility] at [phone].     How will I get the results of any tests ordered?    You will receive all of your results.  If you have signed up for [employer], any tests ordered at your visit will be available to you after your physician reviews them.  Typically this takes 1-2 weeks.  If there are urgent results that require a change in your care plan, your physician or nurse will  call you to discuss the next steps.      What is [employer]?  [employer] is a secure way for you to access all of your healthcare records from the [facility].  It is a web based computer program, so you can sign on to it from any location.  It also allows you to send secure messages to your care team.  I recommend signing up for [employer] access if you have not already done so and are comfortable with using a computer.      How to I schedule a follow-up visit?  If you did not schedule a follow-up visit today, please call [phone] to schedule a follow-up office visit.      How do I schedule any test(s) that were ordered?  -- If lab orders were placed: to schedule lab appointment here, use my chart or call [phone].   -- If imaging orders were placed: To schedule any imaging, please call [phone]   -- If an upper endoscopy and/or colonoscopy was ordered: If you have not heard from endoscopy within a week, please call [phone] to schedule.        Sincerely,  Anastasiia Contreras PA-C

## 2020-10-16 NOTE — PROGRESS NOTES
"Wilber Hamm is a 49 year old male who is being evaluated via a billable video visit.      The patient has been notified of following:     \"This video visit will be conducted via a call between you and your physician/provider. We have found that certain health care needs can be provided without the need for an in-person physical exam.  This service lets us provide the care you need with a video conversation.  If a prescription is necessary we can send it directly to your pharmacy.  If lab work is needed we can place an order for that and you can then stop by our lab to have the test done at a later time.    Video visits are billed at different rates depending on your insurance coverage.  Please reach out to your insurance provider with any questions.    If during the course of the call the physician/provider feels a video visit is not appropriate, you will not be charged for this service.\"    Patient has given verbal consent for Video visit? Yes  How would you like to obtain your AVS? MyChart  If you are dropped from the video visit, the video invite should be resent to: Text to cell phone: [phone]  Will anyone else be joining your video visit? No      Video-Visit Details    Type of service:  Video Visit    Video Start Time: 8:48  Video End Time: 938 (composite of 2 videos) total video time 50 minutes    Originating Location (pt. Location): Home    Distant Location (provider location):  [facility]STR[facility] [place]     Platform used for Video Visit: Des Contreras PA-C       GI CLINIC VISIT    CC/REFERRING MD:  Andrei Sharp  REASON FOR CONSULTATION:   Mr. Hamm is a 49 year old male who I was asked to see in consultation at the request of Dr. Andrei Sharp* for   Chief Complaint   Patient presents with     RECHECK     4 week follow up       ASSESSMENT/PLAN:  1. Epigastric pain   Onset of epigastric pain and bloating Spring 2020 in setting of " longterm NSAID use with augmented use preceding symptom onset. H pylori was negative by stool testing x2. CT AP, CBC, CMP, lipase were unremarkable. He was started on PPI with variable compliance, however eventually took a 5-week course of omeprazole 40 mg with resolution of epigastric pain. The epigastric tightness, bloating has remained without improvement, without worsening postprandially or with bowel movements. No clear triggers for symptoms. Bloating can also occur over the generalized abdomen.    Given partial response to PPI and patient preference, will proceed with upper endoscopy, as already scheduled. Differential includes functional dyspepsia/epigastric pain syndrome, PUD, celiac, backlog of stool. He does not carry any risk factors for SIBO. He has no red flag symptoms at this time.    -- Proceed with EGD as scheduled  -- Start trial of low-dose fiber supplementation  -- Can trial FDGard  -- Future considerations include trial of neuromodulation pending the above.    Follow-up in ~3 months    Anastasiia Contreras PA-C    Division of Gastroenterology, Hepatology & Nutrition  [facility]    Scribe Disclosure:   I, Anastasiia Contreras PA-C, am serving as a scribe; to document services personally performed by Awilda Christian PA-C -based on data collection and the provider's statements to me.      Provider Disclosure:  I agree with above History, Review of Systems, Physical exam and Plan.  I have reviewed the content of the documentation and have edited it as needed. I have personally performed the services documented here and the documentation accurately represents those services and the decisions I have made.      Awilda Christian PA-C  Division of Gastroenterology, Hepatology & Nutrition  [facility]      HPI:   Mr. Hamm is a 49 year old male with prior panic attacks and s/p recent tooth extraction who is referred to GI clinic for follow-up for epigastric pain. He has previously been  seen by Dr. Weber.     Wilber noted onset of epigastric pain and bloating Spring 2020 in setting of longterm NSAID use with augmented use preceding symptom onset. H pylori was negative by stool testing x2. CBC, CMP, lipase were unremarkable.  CT scan 6/27/2020 when seen in the ED for dry socket- no source of epigastric pain. His symptoms were in parallel with a tooth extraction with resultant antibiotic use. He was prescribed PPI but did not take daily. He contnued to endorse upper abdominal tightness, borborygmi.     Interval history 10/16/2020:  Wilber reports that he took the omeprazole 40 mg daily for 5 weeks. [place] through that course, the epigastric/LUQ pain completely resolved. He continued to have a constant sensation of bloating, tightness, predominatnely in the upper abdomen, but can occur anywhere in the generalized abdomen. This did not improve on PPI. He has now been off PPI for 3 weeks without recurrence of pain, but with no change in bloating, tightness. These symptoms are not worsened in postprandial state and are grossly unchanged with bowel movements. Appetite comes and goes but able to complete meals, no early satiety. He has regained the weight that he initially lost at symptom onset. He denies any heartburn, reflux, odynophagia, dysphagia. Bowel pattern is reportedly at his baseline, 1-2 formed stools daily, rare missed days, no BRBPR or melena, no straining,no hard stools. Does sometimes feel incomplete evacuation, usually then followed by an additional bowel movement. No nausea, vomiting, increased flatulence. He chronically has a sore low back, tightness, which is unchanged. Otherwise, no new symptoms.    PERTINENT PAST MEDICAL HISTORY:  Panic attacks    PREVIOUS SURGERIES:  S/p tooth extraction May 2020      PREVIOUS ENDOSCOPY:  None    ALLERGIES:   No Known Allergies    PERTINENT MEDICATIONS:  Current Outpatient Medications   Medication     hydrOXYzine (ATARAX) 25 MG tablet     omeprazole  (PRILOSEC) 40 MG DR capsule     No current facility-administered medications for this visit.    Probiotic  Janell  Omega three  Zinc  Cranberry supplement   Vitamin B12  (on average every other day)     SOCIAL HISTORY:  Social History     Socioeconomic History     Marital status: Single     Spouse name: Not on file     Number of children: Not on file     Years of education: Not on file     Highest education level: Not on file   Occupational History     Not on file   Social Needs     Financial resource strain: Not on file     Food insecurity     Worry: Not on file     Inability: Not on file     Transportation needs     Medical: Not on file     Non-medical: Not on file   Tobacco Use     Smoking status: Never Smoker     Smokeless tobacco: Never Used   Substance and Sexual Activity     Alcohol use: Not Currently     Drug use: Never     Sexual activity: Not Currently   Lifestyle     Physical activity     Days per week: Not on file     Minutes per session: Not on file     Stress: Not on file   Relationships     Social connections     Talks on phone: Not on file     Gets together: Not on file     Attends Jew service: Not on file     Active member of club or organization: Not on file     Attends meetings of clubs or organizations: Not on file     Relationship status: Not on file     Intimate partner violence     Fear of current or ex partner: Not on file     Emotionally abused: Not on file     Physically abused: Not on file     Forced sexual activity: Not on file   Other Topics Concern     Not on file   Social History Narrative     Not on file       FAMILY HISTORY:  Family History   Problem Relation Age of Onset     Colon Cancer No family hx of      Colon Polyps No family hx of      Crohn's Disease No family hx of      Ulcerative Colitis No family hx of      Stomach Cancer No family hx of        Mother: reportedly healthy, may have had some issues with GERD  Father: DMT2  Siblings: reportedly all healthy    Denies any  "known GI malignancies, IBD, or other GI diagnoses in the family.    PHYSICAL EXAMINATION:  Vitals Ht 1.88 m (6' 2\")   Wt 95.7 kg (211 lb)   BMI 27.09 kg/m     Wt   Wt Readings from Last 2 Encounters:   10/16/20 95.7 kg (211 lb)   09/15/20 95.3 kg (210 lb)   Video physical exam  General: Patient appears well in no acute distress. Normal body habitus.  Skin: No visualized rash or lesions on visualized skin  Eyes: EOMI, no erythema, sclera icterus or discharge noted  Resp: Appears to be breathing comfortably without accessory muscle usage, speaking in full sentences, no cough  MSK: Appears to have normal range of motion based on visualized movements  Neurologic: No apparent tremors, facial movements symmetric  Psych: affect normal, alert and oriented    The rest of a comprehensive physical examination is deferred due to PHE (public health emergency) video restrictions        PERTINENT STUDIES:  Orders Only on 06/04/2020   Component Date Value Ref Range Status     Lipase 06/04/2020 114  73 - 393 U/L Final     WBC 06/04/2020 5.5  4.0 - 11.0 10e9/L Final     RBC Count 06/04/2020 5.06  4.4 - 5.9 10e12/L Final     Hemoglobin 06/04/2020 14.8  13.3 - 17.7 g/dL Final     Hematocrit 06/04/2020 44.3  40.0 - 53.0 % Final     MCV 06/04/2020 88  78 - 100 fl Final     MCH 06/04/2020 29.2  26.5 - 33.0 pg Final     MCHC 06/04/2020 33.4  31.5 - 36.5 g/dL Final     RDW 06/04/2020 12.0  10.0 - 15.0 % Final     Platelet Count 06/04/2020 209  150 - 450 10e9/L Final     % Neutrophils 06/04/2020 60.9  % Final     % Lymphocytes 06/04/2020 27.3  % Final     % Monocytes 06/04/2020 9.8  % Final     % Eosinophils 06/04/2020 1.8  % Final     % Basophils 06/04/2020 0.2  % Final     Absolute Neutrophil 06/04/2020 3.3  1.6 - 8.3 10e9/L Final     Absolute Lymphocytes 06/04/2020 1.5  0.8 - 5.3 10e9/L Final     Absolute Monocytes 06/04/2020 0.5  0.0 - 1.3 10e9/L Final     Absolute Eosinophils 06/04/2020 0.1  0.0 - 0.7 10e9/L Final     Absolute " Basophils 06/04/2020 0.0  0.0 - 0.2 10e9/L Final     Diff Method 06/04/2020 Automated Method   Final     Sodium 06/04/2020 138  133 - 144 mmol/L Final     Potassium 06/04/2020 4.1  3.4 - 5.3 mmol/L Final     Chloride 06/04/2020 105  94 - 109 mmol/L Final     Carbon Dioxide 06/04/2020 29  20 - 32 mmol/L Final     Anion Gap 06/04/2020 4  3 - 14 mmol/L Final     Glucose 06/04/2020 90  70 - 99 mg/dL Final     Urea Nitrogen 06/04/2020 16  7 - 30 mg/dL Final     Creatinine 06/04/2020 0.90  0.66 - 1.25 mg/dL Final     GFR Estimate 06/04/2020 >90  >60 mL/min/[1.73_m2] Final     GFR Estimate If Black 06/04/2020 >90  >60 mL/min/[1.73_m2] Final     Calcium 06/04/2020 9.5  8.5 - 10.1 mg/dL Final     Bilirubin Total 06/04/2020 0.7  0.2 - 1.3 mg/dL Final     Albumin 06/04/2020 4.0  3.4 - 5.0 g/dL Final     Protein Total 06/04/2020 7.6  6.8 - 8.8 g/dL Final     Alkaline Phosphatase 06/04/2020 68  40 - 150 U/L Final     ALT 06/04/2020 26  0 - 70 U/L Final     AST 06/04/2020 20  0 - 45 U/L Final

## 2020-10-16 NOTE — LETTER
"    10/16/2020         RE: Wilber Hamm  [address] [place] [address]        Dear Colleague,    Thank you for referring your patient, Wilber Hamm, to the [facility] [place]. Please see a copy of my visit note below.    Wilber Hamm is a 49 year old male who is being evaluated via a billable video visit.      The patient has been notified of following:     \"This video visit will be conducted via a call between you and your physician/provider. We have found that certain health care needs can be provided without the need for an in-person physical exam.  This service lets us provide the care you need with a video conversation.  If a prescription is necessary we can send it directly to your pharmacy.  If lab work is needed we can place an order for that and you can then stop by our lab to have the test done at a later time.    Video visits are billed at different rates depending on your insurance coverage.  Please reach out to your insurance provider with any questions.    If during the course of the call the physician/provider feels a video visit is not appropriate, you will not be charged for this service.\"    Patient has given verbal consent for Video visit? Yes  How would you like to obtain your AVS? MyChart  If you are dropped from the video visit, the video invite should be resent to: Text to cell phone: [phone]  Will anyone else be joining your video visit? No      Video-Visit Details    Type of service:  Video Visit    Video Start Time: 8:48  Video End Time: 938 (composite of 2 videos) total video time 50 minutes    Originating Location (pt. Location): Home    Distant Location (provider location):  [facility] [place]     Platform used for Video Visit: Des Contreras PA-C       GI CLINIC VISIT    CC/REFERRING MD:  Andrei Sharp  REASON FOR CONSULTATION:   Mr. Hamm is a 49 year old male who " I was asked to see in consultation at the request of Dr. Andrei Sharp* for   Chief Complaint   Patient presents with     RECHECK     4 week follow up       ASSESSMENT/PLAN:  1. Epigastric pain   Onset of epigastric pain and bloating Spring 2020 in setting of longterm NSAID use with augmented use preceding symptom onset. H pylori was negative by stool testing x2. CT AP, CBC, CMP, lipase were unremarkable. He was started on PPI with variable compliance, however eventually took a 5-week course of omeprazole 40 mg with resolution of epigastric pain. The epigastric tightness, bloating has remained without improvement, without worsening postprandially or with bowel movements. No clear triggers for symptoms. Bloating can also occur over the generalized abdomen.    Given partial response to PPI and patient preference, will proceed with upper endoscopy, as already scheduled. Differential includes functional dyspepsia/epigastric pain syndrome, PUD, celiac, backlog of stool. He does not carry any risk factors for SIBO. He has no red flag symptoms at this time.    -- Proceed with EGD as scheduled  -- Start trial of low-dose fiber supplementation  -- Can trial FDGard  -- Future considerations include trial of neuromodulation pending the above.    Follow-up in ~3 months    Anastasiia Contreras PA-C    Division of Gastroenterology, Hepatology & Nutrition  [facility]    Scribe Disclosure:   I, Anastasiia Contreras PA-C, am serving as a scribe; to document services personally performed by Awilda Christian PA-C -based on data collection and the provider's statements to me.      Provider Disclosure:  I agree with above History, Review of Systems, Physical exam and Plan.  I have reviewed the content of the documentation and have edited it as needed. I have personally performed the services documented here and the documentation accurately represents those services and the decisions I have made.      Awilda Christian  ROBYN  Division of Gastroenterology, Hepatology & Nutrition  [facility]      HPI:   Mr. Hamm is a 49 year old male with prior panic attacks and s/p recent tooth extraction who is referred to GI clinic for follow-up for epigastric pain. He has previously been seen by Dr. Weber.     Wilber noted onset of epigastric pain and bloating Spring 2020 in setting of longterm NSAID use with augmented use preceding symptom onset. H pylori was negative by stool testing x2. CBC, CMP, lipase were unremarkable.  CT scan 6/27/2020 when seen in the ED for dry socket- no source of epigastric pain. His symptoms were in parallel with a tooth extraction with resultant antibiotic use. He was prescribed PPI but did not take daily. He contnued to endorse upper abdominal tightness, borborygmi.     Interval history 10/16/2020:  Wilber reports that he took the omeprazole 40 mg daily for 5 weeks. shelter through that course, the epigastric/LUQ pain completely resolved. He continued to have a constant sensation of bloating, tightness, predominatnely in the upper abdomen, but can occur anywhere in the generalized abdomen. This did not improve on PPI. He has now been off PPI for 3 weeks without recurrence of pain, but with no change in bloating, tightness. These symptoms are not worsened in postprandial state and are grossly unchanged with bowel movements. Appetite comes and goes but able to complete meals, no early satiety. He has regained the weight that he initially lost at symptom onset. He denies any heartburn, reflux, odynophagia, dysphagia. Bowel pattern is reportedly at his baseline, 1-2 formed stools daily, rare missed days, no BRBPR or melena, no straining,no hard stools. Does sometimes feel incomplete evacuation, usually then followed by an additional bowel movement. No nausea, vomiting, increased flatulence. He chronically has a sore low back, tightness, which is unchanged. Otherwise, no new symptoms.    PERTINENT PAST  MEDICAL HISTORY:  Panic attacks    PREVIOUS SURGERIES:  S/p tooth extraction May 2020      PREVIOUS ENDOSCOPY:  None    ALLERGIES:   No Known Allergies    PERTINENT MEDICATIONS:  Current Outpatient Medications   Medication     hydrOXYzine (ATARAX) 25 MG tablet     omeprazole (PRILOSEC) 40 MG DR capsule     No current facility-administered medications for this visit.    Probiotic  Jnaell  Omega three  Zinc  Cranberry supplement   Vitamin B12  (on average every other day)     SOCIAL HISTORY:  Social History     Socioeconomic History     Marital status: Single     Spouse name: Not on file     Number of children: Not on file     Years of education: Not on file     Highest education level: Not on file   Occupational History     Not on file   Social Needs     Financial resource strain: Not on file     Food insecurity     Worry: Not on file     Inability: Not on file     Transportation needs     Medical: Not on file     Non-medical: Not on file   Tobacco Use     Smoking status: Never Smoker     Smokeless tobacco: Never Used   Substance and Sexual Activity     Alcohol use: Not Currently     Drug use: Never     Sexual activity: Not Currently   Lifestyle     Physical activity     Days per week: Not on file     Minutes per session: Not on file     Stress: Not on file   Relationships     Social connections     Talks on phone: Not on file     Gets together: Not on file     Attends Tenriism service: Not on file     Active member of club or organization: Not on file     Attends meetings of clubs or organizations: Not on file     Relationship status: Not on file     Intimate partner violence     Fear of current or ex partner: Not on file     Emotionally abused: Not on file     Physically abused: Not on file     Forced sexual activity: Not on file   Other Topics Concern     Not on file   Social History Narrative     Not on file       FAMILY HISTORY:  Family History   Problem Relation Age of Onset     Colon Cancer No family hx of   "    Colon Polyps No family hx of      Crohn's Disease No family hx of      Ulcerative Colitis No family hx of      Stomach Cancer No family hx of        Mother: reportedly healthy, may have had some issues with GERD  Father: DMT2  Siblings: reportedly all healthy    Denies any known GI malignancies, IBD, or other GI diagnoses in the family.    PHYSICAL EXAMINATION:  Vitals Ht 1.88 m (6' 2\")   Wt 95.7 kg (211 lb)   BMI 27.09 kg/m     Wt   Wt Readings from Last 2 Encounters:   10/16/20 95.7 kg (211 lb)   09/15/20 95.3 kg (210 lb)   Video physical exam  General: Patient appears well in no acute distress. Normal body habitus.  Skin: No visualized rash or lesions on visualized skin  Eyes: EOMI, no erythema, sclera icterus or discharge noted  Resp: Appears to be breathing comfortably without accessory muscle usage, speaking in full sentences, no cough  MSK: Appears to have normal range of motion based on visualized movements  Neurologic: No apparent tremors, facial movements symmetric  Psych: affect normal, alert and oriented    The rest of a comprehensive physical examination is deferred due to PHE (public health emergency) video restrictions        PERTINENT STUDIES:  Orders Only on 06/04/2020   Component Date Value Ref Range Status     Lipase 06/04/2020 114  73 - 393 U/L Final     WBC 06/04/2020 5.5  4.0 - 11.0 10e9/L Final     RBC Count 06/04/2020 5.06  4.4 - 5.9 10e12/L Final     Hemoglobin 06/04/2020 14.8  13.3 - 17.7 g/dL Final     Hematocrit 06/04/2020 44.3  40.0 - 53.0 % Final     MCV 06/04/2020 88  78 - 100 fl Final     MCH 06/04/2020 29.2  26.5 - 33.0 pg Final     MCHC 06/04/2020 33.4  31.5 - 36.5 g/dL Final     RDW 06/04/2020 12.0  10.0 - 15.0 % Final     Platelet Count 06/04/2020 209  150 - 450 10e9/L Final     % Neutrophils 06/04/2020 60.9  % Final     % Lymphocytes 06/04/2020 27.3  % Final     % Monocytes 06/04/2020 9.8  % Final     % Eosinophils 06/04/2020 1.8  % Final     % Basophils 06/04/2020 0.2  % " Final     Absolute Neutrophil 06/04/2020 3.3  1.6 - 8.3 10e9/L Final     Absolute Lymphocytes 06/04/2020 1.5  0.8 - 5.3 10e9/L Final     Absolute Monocytes 06/04/2020 0.5  0.0 - 1.3 10e9/L Final     Absolute Eosinophils 06/04/2020 0.1  0.0 - 0.7 10e9/L Final     Absolute Basophils 06/04/2020 0.0  0.0 - 0.2 10e9/L Final     Diff Method 06/04/2020 Automated Method   Final     Sodium 06/04/2020 138  133 - 144 mmol/L Final     Potassium 06/04/2020 4.1  3.4 - 5.3 mmol/L Final     Chloride 06/04/2020 105  94 - 109 mmol/L Final     Carbon Dioxide 06/04/2020 29  20 - 32 mmol/L Final     Anion Gap 06/04/2020 4  3 - 14 mmol/L Final     Glucose 06/04/2020 90  70 - 99 mg/dL Final     Urea Nitrogen 06/04/2020 16  7 - 30 mg/dL Final     Creatinine 06/04/2020 0.90  0.66 - 1.25 mg/dL Final     GFR Estimate 06/04/2020 >90  >60 mL/min/[1.73_m2] Final     GFR Estimate If Black 06/04/2020 >90  >60 mL/min/[1.73_m2] Final     Calcium 06/04/2020 9.5  8.5 - 10.1 mg/dL Final     Bilirubin Total 06/04/2020 0.7  0.2 - 1.3 mg/dL Final     Albumin 06/04/2020 4.0  3.4 - 5.0 g/dL Final     Protein Total 06/04/2020 7.6  6.8 - 8.8 g/dL Final     Alkaline Phosphatase 06/04/2020 68  40 - 150 U/L Final     ALT 06/04/2020 26  0 - 70 U/L Final     AST 06/04/2020 20  0 - 45 U/L Final                 Again, thank you for allowing me to participate in the care of your patient.        Sincerely,        Awilda Christian PA-C

## 2020-10-19 DIAGNOSIS — Z11.59 ENCOUNTER FOR SCREENING FOR OTHER VIRAL DISEASES: ICD-10-CM

## 2020-10-19 PROCEDURE — U0003 INFECTIOUS AGENT DETECTION BY NUCLEIC ACID (DNA OR RNA); SEVERE ACUTE RESPIRATORY SYNDROME CORONAVIRUS 2 (SARS-COV-2) (CORONAVIRUS DISEASE [COVID-19]), AMPLIFIED PROBE TECHNIQUE, MAKING USE OF HIGH THROUGHPUT TECHNOLOGIES AS DESCRIBED BY CMS-2020-01-R: HCPCS | Performed by: INTERNAL MEDICINE

## 2020-10-20 LAB
SARS-COV-2 RNA SPEC QL NAA+PROBE: NOT DETECTED
SPECIMEN SOURCE: NORMAL

## 2020-10-23 ENCOUNTER — HOSPITAL ENCOUNTER (OUTPATIENT)
Facility: AMBULATORY SURGERY CENTER | Age: 50
Discharge: HOME | End: 2020-10-23
Attending: INTERNAL MEDICINE | Admitting: INTERNAL MEDICINE
Payer: COMMERCIAL

## 2020-10-23 VITALS
HEART RATE: 56 BPM | DIASTOLIC BLOOD PRESSURE: 81 MMHG | HEIGHT: 74 IN | BODY MASS INDEX: 26.95 KG/M2 | RESPIRATION RATE: 16 BRPM | WEIGHT: 210 LBS | OXYGEN SATURATION: 97 % | TEMPERATURE: 97.8 F | SYSTOLIC BLOOD PRESSURE: 124 MMHG

## 2020-10-23 LAB — UPPER GI ENDOSCOPY: NORMAL

## 2020-10-23 PROCEDURE — 43239 EGD BIOPSY SINGLE/MULTIPLE: CPT

## 2020-10-23 PROCEDURE — G8907 PT DOC NO EVENTS ON DISCHARG: HCPCS

## 2020-10-23 PROCEDURE — 88305 TISSUE EXAM BY PATHOLOGIST: CPT | Performed by: PATHOLOGY

## 2020-10-23 PROCEDURE — G8918 PT W/O PREOP ORDER IV AB PRO: HCPCS

## 2020-10-23 RX ORDER — FLUMAZENIL 0.1 MG/ML
0.2 INJECTION, SOLUTION INTRAVENOUS
Status: SHIPPED | OUTPATIENT
Start: 2020-10-23 | End: 2020-10-23

## 2020-10-23 RX ORDER — ONDANSETRON 2 MG/ML
4 INJECTION INTRAMUSCULAR; INTRAVENOUS EVERY 6 HOURS PRN
Status: DISCONTINUED | OUTPATIENT
Start: 2020-10-23 | End: 2020-10-24 | Stop reason: HOSPADM

## 2020-10-23 RX ORDER — ONDANSETRON 4 MG/1
4 TABLET, ORALLY DISINTEGRATING ORAL EVERY 6 HOURS PRN
Status: DISCONTINUED | OUTPATIENT
Start: 2020-10-23 | End: 2020-10-24 | Stop reason: HOSPADM

## 2020-10-23 RX ORDER — LIDOCAINE 40 MG/G
CREAM TOPICAL
Status: DISCONTINUED | OUTPATIENT
Start: 2020-10-23 | End: 2020-10-24 | Stop reason: HOSPADM

## 2020-10-23 RX ORDER — PROCHLORPERAZINE MALEATE 10 MG
10 TABLET ORAL EVERY 6 HOURS PRN
Status: DISCONTINUED | OUTPATIENT
Start: 2020-10-23 | End: 2020-10-24 | Stop reason: HOSPADM

## 2020-10-23 RX ORDER — ONDANSETRON 2 MG/ML
4 INJECTION INTRAMUSCULAR; INTRAVENOUS
Status: DISCONTINUED | OUTPATIENT
Start: 2020-10-23 | End: 2020-10-24 | Stop reason: HOSPADM

## 2020-10-23 RX ORDER — DIPHENHYDRAMINE HYDROCHLORIDE 50 MG/ML
INJECTION INTRAMUSCULAR; INTRAVENOUS PRN
Status: DISCONTINUED | OUTPATIENT
Start: 2020-10-23 | End: 2020-10-23 | Stop reason: HOSPADM

## 2020-10-23 RX ORDER — FENTANYL CITRATE 50 UG/ML
INJECTION, SOLUTION INTRAMUSCULAR; INTRAVENOUS PRN
Status: DISCONTINUED | OUTPATIENT
Start: 2020-10-23 | End: 2020-10-23 | Stop reason: HOSPADM

## 2020-10-23 RX ORDER — NALOXONE HYDROCHLORIDE 0.4 MG/ML
.1-.4 INJECTION, SOLUTION INTRAMUSCULAR; INTRAVENOUS; SUBCUTANEOUS
Status: DISCONTINUED | OUTPATIENT
Start: 2020-10-23 | End: 2020-10-24 | Stop reason: HOSPADM

## 2020-10-26 ENCOUNTER — TELEPHONE (OUTPATIENT)
Dept: GASTROENTEROLOGY | Facility: CLINIC | Age: 50
End: 2020-10-26

## 2020-10-26 NOTE — TELEPHONE ENCOUNTER
[facility] Call Center    Phone Message    May a detailed message be left on voicemail: no     Reason for Call: Symptoms or Concerns     If patient has red-flag symptoms, warm transfer to triage line    Current symptom or concern: Had EGD last 10.23.20. Has questions regarding post op concerns. Pt has questions and experiencing chest discomfort on right side (muscular feeling).  Declined FNA. Pt thinks it is related to procedure.     Symptoms have been present for:  2 day(s)    HAre there any new or worsening symptoms? No      Action Taken: Message routed to:  Adult Clinics: Gastroenterology (GI) p [id number]  Travel Screening:

## 2020-10-26 NOTE — TELEPHONE ENCOUNTER
"Writer called patient to see if there were any questions from his EGD procedure with Dr. Best.  Patient stated he was very groggy whern coming out of his procedure and had wanted to talk to the doctor concerning questions.  Dr. Best was in clinic, so was asked these questions directly.   The patient inquired if there was cancer present and there was none per Dr. Best.  Patient asked about the Z line [place] 1 and was explained to him this referred to his stomach sphincter and the explanation given was that it\" looked good\" and was tight and didn't leak. Open sphincters are a sign of gastric reflux and his was tight and looked well.  The mucosa question is referring to biopsies that were taken and we would let him know about those when the results came back. The patient asked\"why were biopsies taken if no H Pylori?\" . Writer told patient they were checking for other things and confirming that it wasn't H Pylori and when results came back we would call the patient.  Patient also asked about PPIs and if he still needed to take them. Dr. Best recommended  that the patient still should take them until his follow up visit in three months and that he and his follow up provider could discuss this at his next encounter to see if he still needed to take them.  Patient had no further questions at this time.  Richard Doran MA    "

## 2020-10-27 LAB — COPATH REPORT: NORMAL

## 2020-10-28 ENCOUNTER — TELEPHONE (OUTPATIENT)
Dept: GASTROENTEROLOGY | Facility: CLINIC | Age: 50
End: 2020-10-28

## 2020-10-28 NOTE — TELEPHONE ENCOUNTER
Patient has viewed [employer] result note sent by Dr. Irvin today.      Alisia Irvin, DO  You 10 minutes ago (11:57 AM)      His biopsies are essentially normal and not causing his symptoms.  There's no H. Pylori and definitely no cancer which I know is his biggest concern. He should follow-up with Awilda Christian to discuss next steps. I put a path note in as well    Message text      Sayda Leyva LPN

## 2020-10-28 NOTE — TELEPHONE ENCOUNTER
"LPN spoke with patient and notified him that his results are in, but have not been reviewed at this time by the provider. Writer stated that a message would be sent to provider and provider would send a Call Britannia message with results or clinic would contact him. Patient stated \"if someone could call me today and let me know that would be great because I have really bad anxiety about this and that's not good for me.\"    Sayda Leyva LPN    "

## 2020-11-03 ENCOUNTER — TELEPHONE (OUTPATIENT)
Dept: GASTROENTEROLOGY | Facility: CLINIC | Age: 50
End: 2020-11-03

## 2020-11-03 DIAGNOSIS — K30 FUNCTIONAL DYSPEPSIA: Primary | ICD-10-CM

## 2020-11-03 NOTE — TELEPHONE ENCOUNTER
[facility] Call Center    Phone Message    May a detailed message be left on voicemail: yes     Reason for Call: Other: Patient requesting call back to discuss plan of care going forward.  He knows EGD results and wants to know what his plan is until his next appointment.      Action Taken: Message routed to:  [facility] GI    Travel Screening: Not Applicable

## 2020-11-06 RX ORDER — NORTRIPTYLINE HCL 10 MG
10 CAPSULE ORAL AT BEDTIME
Qty: 30 CAPSULE | Refills: 11 | Status: SHIPPED | OUTPATIENT
Start: 2020-11-06 | End: 2021-02-24

## 2020-11-06 NOTE — TELEPHONE ENCOUNTER
[facility] Call Center    Phone Message    May a detailed message be left on voicemail: yes     Reason for Call: Other: Patient is requesting a call back from Awilda Christian, to discuss what the plan is until his next appt.  He still doesn't feel normal and doesn't want it to get worse.  What should patient be doing?  What medications?  Please follow up with patient TODAY, 11/6/20, as per patient's request.  Thank you!     Action Taken: Message routed to:  [facility]    Travel Screening: Not Applicable

## 2020-11-06 NOTE — TELEPHONE ENCOUNTER
Patient called to discuss results of recent EGD. Mild erythema noted on endoscopy, biopsies notable for reactive gastropathy. Reports overall symptoms have improved significantly, but does continue to be bothered by symptoms.     Will plan for trial of neuromodulator with nortriptyline 10 mg at night. Medications discussed. Will plan to follow-up in clinic in 4-6 weeks (will message schedulers)    Awilda Christian PA-C  Division of Gastroenterology, Hepatology & Nutrition  [facility]

## 2020-12-21 ENCOUNTER — VIRTUAL VISIT (OUTPATIENT)
Dept: GASTROENTEROLOGY | Facility: CLINIC | Age: 50
End: 2020-12-21
Payer: COMMERCIAL

## 2020-12-21 VITALS — HEIGHT: 74 IN | BODY MASS INDEX: 27.21 KG/M2 | WEIGHT: 212 LBS

## 2020-12-21 DIAGNOSIS — R10.13 DYSPEPSIA: Primary | ICD-10-CM

## 2020-12-21 PROCEDURE — 99214 OFFICE O/P EST MOD 30 MIN: CPT | Mod: 95 | Performed by: PHYSICIAN ASSISTANT

## 2020-12-21 RX ORDER — OMEPRAZOLE 40 MG/1
40 CAPSULE, DELAYED RELEASE ORAL DAILY
Qty: 60 CAPSULE | Refills: 1 | Status: SHIPPED | OUTPATIENT
Start: 2020-12-21

## 2020-12-21 ASSESSMENT — MIFFLIN-ST. JEOR: SCORE: 1901.38

## 2020-12-21 NOTE — PROGRESS NOTES
GI CLINIC VISIT    CC/REFERRING MD:  Andrei Sharp  REASON FOR CONSULTATION:   Mr. Hamm is a 49 year old male who I was asked to see in consultation at the request of Dr. Andrei Sharp* for follow-up    ASSESSMENT/PLAN:  1. Epigastric pain   Onset of epigastric pain and bloating Spring 2020 in setting of longterm NSAID use with augmented use preceding symptom onset. H pylori was negative by stool testing x2. CT AP, CBC, CMP, lipase were unremarkable. He was started on PPI with variable compliance, however eventually took a 5-week course of omeprazole 40 mg with resolution of epigastric pain. The epigastric tightness, bloating has remained without improvement, without worsening postprandially or with bowel movements. EGD with reactive gastropathy . Differential includes functional dyspepsia/epigastric pain syndrome vs GERD w/ out esophagitis. Discussed we could further evaluate GERD with pH impedence.     Given symptoms off PPI, would recommend re-starting at this time with 40 mg daily if he continues to have symptoms. Medication refilled. Can discuss decreasing dose to 20 mg at follow-up visit. Discussed that future considerations include TCA.    Plan:   -- re-start PPI with omeprazole 40 mg daily   -- take multi-vitamins   -- monitor symptoms     Follow-up in 2 months    I spent a total of 30 minutes face-to-face (video) with Wilber Hamm during today's office visit.  Over 50% of which was counseling/coordinating care regarding the above delineated issues. Please see note for details.      Awilda Crhistian PA-C  Division of Gastroenterology, Hepatology & Nutrition  [facility]      HPI:   Mr. Hamm is a 49 year old male with prior panic attacks and s/p recent tooth extraction who is referred to GI clinic for follow-up for epigastric pain. He has previously been seen by Dr. Weber.     Wilber noted onset of epigastric pain and bloating Spring 2020 in setting of longterm NSAID use  with augmented use preceding symptom onset. H pylori was negative by stool testing x2. CBC, CMP, lipase were unremarkable.  CT scan 6/27/2020 when seen in the ED for dry socket- no source of epigastric pain. His symptoms were in parallel with a tooth extraction with resultant antibiotic use. He was prescribed PPI but did not take daily. He contnued to endorse upper abdominal tightness, borborygmi.     Interval history 10/16/2020:  Wilber reports that he took the omeprazole 40 mg daily for 5 weeks. shelter through that course, the epigastric/LUQ pain completely resolved. He continued to have a constant sensation of bloating, tightness, predominatnely in the upper abdomen, but can occur anywhere in the generalized abdomen. This did not improve on PPI. He has now been off PPI for 3 weeks without recurrence of pain, but with no change in bloating, tightness. These symptoms are not worsened in postprandial state and are grossly unchanged with bowel movements. Appetite comes and goes but able to complete meals, no early satiety. He has regained the weight that he initially lost at symptom onset. He denies any heartburn, reflux, odynophagia, dysphagia. Bowel pattern is reportedly at his baseline, 1-2 formed stools daily, rare missed days, no BRBPR or melena, no straining,no hard stools. Does sometimes feel incomplete evacuation, usually then followed by an additional bowel movement. No nausea, vomiting, increased flatulence. He chronically has a sore low back, tightness, which is unchanged. Otherwise, no new symptoms.    12/21/2020:  Had EGD-Mild erythema noted on endoscopy, biopsies notable for reactive gastropathy. Reports overall symptoms have improved significantly, but does continue to be bothered by symptoms. Discussed potentially starting TCA- he did not end up starting this.     Since the patient's last visit, he continued PPI. November 10th- 12/10. The last week to 10 days have been difficult again. A little more  "stomach discomfort, tightness/bloating, increased noise with bowel movements. Symptoms do not seem to be better for the last week to 10 days. Will have a period of a few hours. Discomfort is about the same as before. He has felt a little more \"quesy\" in the morning but not extreme nausea.     No symptoms at all with omeprazole.     GI ROS:  No melena, hematochezia  Appetite- has been stable- can fluctuate and be good   No vomiting   No other whole body type symptoms   Occasional lightheadedness, some fatigue   No fever or chills  No headaches    PERTINENT PAST MEDICAL HISTORY:  Panic attacks    PREVIOUS SURGERIES:  S/p tooth extraction May 2020      PREVIOUS ENDOSCOPY:  10/23/2020:  Impression:               - Z-line regular, 44 cm from the incisors.                             - Gastroesophageal flap valve classified as Hill                             Grade I (prominent fold, tight to endoscope).                             - Erythematous mucosa in the antrum. Biopsied.                             - Normal examined duodenum.   FINAL DIAGNOSIS:   Stomach, Antrum and Body, Biopsy:   Gastric antral & fundic mucosa with mild reactive antral gastropathy   otherwise no significant histologic   abnormality; negative for intestinal metaplasia or dysplasia; no H. pylori    like organisms identified on   routine staining     ALLERGIES:   No Known Allergies    PERTINENT MEDICATIONS:  Current Outpatient Medications   Medication     hydrOXYzine (ATARAX) 25 MG tablet     nortriptyline (PAMELOR) 10 MG capsule     omeprazole (PRILOSEC) 40 MG DR capsule     No current facility-administered medications for this visit.    Probiotic  Janell  Omega three  Zinc  Cranberry supplement   Vitamin B12  (on average every other day)     SOCIAL HISTORY:  Social History     Socioeconomic History     Marital status: Single     Spouse name: Not on file     Number of children: Not on file     Years of education: Not on file     Highest education " level: Not on file   Occupational History     Not on file   Social Needs     Financial resource strain: Not on file     Food insecurity     Worry: Not on file     Inability: Not on file     Transportation needs     Medical: Not on file     Non-medical: Not on file   Tobacco Use     Smoking status: Never Smoker     Smokeless tobacco: Never Used   Substance and Sexual Activity     Alcohol use: Not Currently     Drug use: Never     Sexual activity: Not Currently   Lifestyle     Physical activity     Days per week: Not on file     Minutes per session: Not on file     Stress: Not on file   Relationships     Social connections     Talks on phone: Not on file     Gets together: Not on file     Attends Buddhism service: Not on file     Active member of club or organization: Not on file     Attends meetings of clubs or organizations: Not on file     Relationship status: Not on file     Intimate partner violence     Fear of current or ex partner: Not on file     Emotionally abused: Not on file     Physically abused: Not on file     Forced sexual activity: Not on file   Other Topics Concern     Not on file   Social History Narrative     Not on file       FAMILY HISTORY:  Family History   Problem Relation Age of Onset     Colon Cancer No family hx of      Colon Polyps No family hx of      Crohn's Disease No family hx of      Ulcerative Colitis No family hx of      Stomach Cancer No family hx of        Mother: reportedly healthy, may have had some issues with GERD  Father: DMT2  Siblings: reportedly all healthy    Denies any known GI malignancies, IBD, or other GI diagnoses in the family.    PHYSICAL EXAMINATION:  Vitals There were no vitals taken for this visit.   Wt   Wt Readings from Last 2 Encounters:   10/16/20 95.3 kg (210 lb)   10/16/20 95.7 kg (211 lb)   Video physical exam  General: Patient appears well in no acute distress. Normal body habitus.  Skin: No visualized rash or lesions on visualized skin  Eyes: EOMI, no  erythema, sclera icterus or discharge noted  Resp: Appears to be breathing comfortably without accessory muscle usage, speaking in full sentences, no cough  MSK: Appears to have normal range of motion based on visualized movements  Neurologic: No apparent tremors, facial movements symmetric  Psych: affect normal, alert and oriented    The rest of a comprehensive physical examination is deferred due to PHE (public health emergency) video restrictions    PERTINENT STUDIES:  Orders Only on 06/04/2020   Component Date Value Ref Range Status     Lipase 06/04/2020 114  73 - 393 U/L Final     WBC 06/04/2020 5.5  4.0 - 11.0 10e9/L Final     RBC Count 06/04/2020 5.06  4.4 - 5.9 10e12/L Final     Hemoglobin 06/04/2020 14.8  13.3 - 17.7 g/dL Final     Hematocrit 06/04/2020 44.3  40.0 - 53.0 % Final     MCV 06/04/2020 88  78 - 100 fl Final     MCH 06/04/2020 29.2  26.5 - 33.0 pg Final     MCHC 06/04/2020 33.4  31.5 - 36.5 g/dL Final     RDW 06/04/2020 12.0  10.0 - 15.0 % Final     Platelet Count 06/04/2020 209  150 - 450 10e9/L Final     % Neutrophils 06/04/2020 60.9  % Final     % Lymphocytes 06/04/2020 27.3  % Final     % Monocytes 06/04/2020 9.8  % Final     % Eosinophils 06/04/2020 1.8  % Final     % Basophils 06/04/2020 0.2  % Final     Absolute Neutrophil 06/04/2020 3.3  1.6 - 8.3 10e9/L Final     Absolute Lymphocytes 06/04/2020 1.5  0.8 - 5.3 10e9/L Final     Absolute Monocytes 06/04/2020 0.5  0.0 - 1.3 10e9/L Final     Absolute Eosinophils 06/04/2020 0.1  0.0 - 0.7 10e9/L Final     Absolute Basophils 06/04/2020 0.0  0.0 - 0.2 10e9/L Final     Diff Method 06/04/2020 Automated Method   Final     Sodium 06/04/2020 138  133 - 144 mmol/L Final     Potassium 06/04/2020 4.1  3.4 - 5.3 mmol/L Final     Chloride 06/04/2020 105  94 - 109 mmol/L Final     Carbon Dioxide 06/04/2020 29  20 - 32 mmol/L Final     Anion Gap 06/04/2020 4  3 - 14 mmol/L Final     Glucose 06/04/2020 90  70 - 99 mg/dL Final     Urea Nitrogen 06/04/2020 16   7 - 30 mg/dL Final     Creatinine 06/04/2020 0.90  0.66 - 1.25 mg/dL Final     GFR Estimate 06/04/2020 >90  >60 mL/min/[1.73_m2] Final     GFR Estimate If Black 06/04/2020 >90  >60 mL/min/[1.73_m2] Final     Calcium 06/04/2020 9.5  8.5 - 10.1 mg/dL Final     Bilirubin Total 06/04/2020 0.7  0.2 - 1.3 mg/dL Final     Albumin 06/04/2020 4.0  3.4 - 5.0 g/dL Final     Protein Total 06/04/2020 7.6  6.8 - 8.8 g/dL Final     Alkaline Phosphatase 06/04/2020 68  40 - 150 U/L Final     ALT 06/04/2020 26  0 - 70 U/L Final     AST 06/04/2020 20  0 - 45 U/L Final

## 2020-12-21 NOTE — NURSING NOTE
"Chief Complaint   Patient presents with     Follow Up       Vitals:    12/21/20 1058   Weight: 96.2 kg (212 lb)   Height: 1.88 m (6' 2\")       Body mass index is 27.22 kg/m .    Chela Edwards CMA    "

## 2020-12-21 NOTE — PROGRESS NOTES
"Wilber Hamm is a 50 year old male who is being evaluated via a billable video visit.      The patient has been notified of following:     \"This video visit will be conducted via a call between you and your physician/provider. We have found that certain health care needs can be provided without the need for an in-person physical exam.  This service lets us provide the care you need with a video conversation.  If a prescription is necessary we can send it directly to your pharmacy.  If lab work is needed we can place an order for that and you can then stop by our lab to have the test done at a later time.    Video visits are billed at different rates depending on your insurance coverage.  Please reach out to your insurance provider with any questions.    If during the course of the call the physician/provider feels a video visit is not appropriate, you will not be charged for this service.\"    Patient has given verbal consent for Video visit? Yes  How would you like to obtain your AVS? MyChart  If you are dropped from the video visit, the video invite should be resent to: Text to cell phone: [phone]  Will anyone else be joining your video visit? No      Video-Visit Details    Type of service:  Video Visit    Video Start Time: 11:03 AM  Video End Time: 11:33 AM    Originating Location (pt. Location): Home    Distant Location (provider location):  [facility] [place] (provider's home)    Platform used for Video Visit: Des Christian PA-C        "

## 2020-12-21 NOTE — LETTER
12/21/2020       RE: Wilber Hamm  [address] [place] [address]  [place] [address]      Dear Colleague,    Thank you for referring your patient, Wilber Hamm, to the [facility] [place]. Please see a copy of my visit note below.    GI CLINIC VISIT    CC/REFERRING MD:  Andrei Sharp  REASON FOR CONSULTATION:   Mr. Hamm is a 49 year old male who I was asked to see in consultation at the request of Dr. Andrei Sharp* for follow-up    ASSESSMENT/PLAN:  1. Epigastric pain   Onset of epigastric pain and bloating Spring 2020 in setting of longterm NSAID use with augmented use preceding symptom onset. H pylori was negative by stool testing x2. CT AP, CBC, CMP, lipase were unremarkable. He was started on PPI with variable compliance, however eventually took a 5-week course of omeprazole 40 mg with resolution of epigastric pain. The epigastric tightness, bloating has remained without improvement, without worsening postprandially or with bowel movements. EGD with reactive gastropathy . Differential includes functional dyspepsia/epigastric pain syndrome vs GERD w/ out esophagitis. Discussed we could further evaluate GERD with pH impedence.     Given symptoms off PPI, would recommend re-starting at this time with 40 mg daily if he continues to have symptoms. Medication refilled. Can discuss decreasing dose to 20 mg at follow-up visit. Discussed that future considerations include TCA.    Plan:   -- re-start PPI with omeprazole 40 mg daily   -- take multi-vitamins   -- monitor symptoms     Follow-up in 2 months    I spent a total of 30 minutes face-to-face (video) with Wilber Hamm during today's office visit.  Over 50% of which was counseling/coordinating care regarding the above delineated issues. Please see note for details.      Awilda Christian PA-C  Division of Gastroenterology, Hepatology & Nutrition  [facility]      HPI:   Mr. Hamm is a 49 year old  male with prior panic attacks and s/p recent tooth extraction who is referred to GI clinic for follow-up for epigastric pain. He has previously been seen by Dr. Weber.     Wilber noted onset of epigastric pain and bloating Spring 2020 in setting of longterm NSAID use with augmented use preceding symptom onset. H pylori was negative by stool testing x2. CBC, CMP, lipase were unremarkable.  CT scan 6/27/2020 when seen in the ED for dry socket- no source of epigastric pain. His symptoms were in parallel with a tooth extraction with resultant antibiotic use. He was prescribed PPI but did not take daily. He contnued to endorse upper abdominal tightness, borborygmi.     Interval history 10/16/2020:  Wilber reports that he took the omeprazole 40 mg daily for 5 weeks. [facility] through that course, the epigastric/LUQ pain completely resolved. He continued to have a constant sensation of bloating, tightness, predominatnely in the upper abdomen, but can occur anywhere in the generalized abdomen. This did not improve on PPI. He has now been off PPI for 3 weeks without recurrence of pain, but with no change in bloating, tightness. These symptoms are not worsened in postprandial state and are grossly unchanged with bowel movements. Appetite comes and goes but able to complete meals, no early satiety. He has regained the weight that he initially lost at symptom onset. He denies any heartburn, reflux, odynophagia, dysphagia. Bowel pattern is reportedly at his baseline, 1-2 formed stools daily, rare missed days, no BRBPR or melena, no straining,no hard stools. Does sometimes feel incomplete evacuation, usually then followed by an additional bowel movement. No nausea, vomiting, increased flatulence. He chronically has a sore low back, tightness, which is unchanged. Otherwise, no new symptoms.    12/21/2020:  Had EGD-Mild erythema noted on endoscopy, biopsies notable for reactive gastropathy. Reports overall symptoms have improved  "significantly, but does continue to be bothered by symptoms. Discussed potentially starting TCA- he did not end up starting this.     Since the patient's last visit, he continued PPI. November 10th- 12/10. The last week to 10 days have been difficult again. A little more stomach discomfort, tightness/bloating, increased noise with bowel movements. Symptoms do not seem to be better for the last week to 10 days. Will have a period of a few hours. Discomfort is about the same as before. He has felt a little more \"quesy\" in the morning but not extreme nausea.     No symptoms at all with omeprazole.     GI ROS:  No melena, hematochezia  Appetite- has been stable- can fluctuate and be good   No vomiting   No other whole body type symptoms   Occasional lightheadedness, some fatigue   No fever or chills  No headaches    PERTINENT PAST MEDICAL HISTORY:  Panic attacks    PREVIOUS SURGERIES:  S/p tooth extraction May 2020      PREVIOUS ENDOSCOPY:  10/23/2020:  Impression:               - Z-line regular, 44 cm from the incisors.                             - Gastroesophageal flap valve classified as Hill                             Grade I (prominent fold, tight to endoscope).                             - Erythematous mucosa in the antrum. Biopsied.                             - Normal examined duodenum.   FINAL DIAGNOSIS:   Stomach, Antrum and Body, Biopsy:   Gastric antral & fundic mucosa with mild reactive antral gastropathy   otherwise no significant histologic   abnormality; negative for intestinal metaplasia or dysplasia; no H. pylori    like organisms identified on   routine staining     ALLERGIES:   No Known Allergies    PERTINENT MEDICATIONS:  Current Outpatient Medications   Medication     hydrOXYzine (ATARAX) 25 MG tablet     nortriptyline (PAMELOR) 10 MG capsule     omeprazole (PRILOSEC) 40 MG DR capsule     No current facility-administered medications for this visit.    Probiotic  Janell  Omega " three  Zinc  Cranberry supplement   Vitamin B12  (on average every other day)     SOCIAL HISTORY:  Social History     Socioeconomic History     Marital status: Single     Spouse name: Not on file     Number of children: Not on file     Years of education: Not on file     Highest education level: Not on file   Occupational History     Not on file   Social Needs     Financial resource strain: Not on file     Food insecurity     Worry: Not on file     Inability: Not on file     Transportation needs     Medical: Not on file     Non-medical: Not on file   Tobacco Use     Smoking status: Never Smoker     Smokeless tobacco: Never Used   Substance and Sexual Activity     Alcohol use: Not Currently     Drug use: Never     Sexual activity: Not Currently   Lifestyle     Physical activity     Days per week: Not on file     Minutes per session: Not on file     Stress: Not on file   Relationships     Social connections     Talks on phone: Not on file     Gets together: Not on file     Attends Sikh service: Not on file     Active member of club or organization: Not on file     Attends meetings of clubs or organizations: Not on file     Relationship status: Not on file     Intimate partner violence     Fear of current or ex partner: Not on file     Emotionally abused: Not on file     Physically abused: Not on file     Forced sexual activity: Not on file   Other Topics Concern     Not on file   Social History Narrative     Not on file       FAMILY HISTORY:  Family History   Problem Relation Age of Onset     Colon Cancer No family hx of      Colon Polyps No family hx of      Crohn's Disease No family hx of      Ulcerative Colitis No family hx of      Stomach Cancer No family hx of        Mother: reportedly healthy, may have had some issues with GERD  Father: DMT2  Siblings: reportedly all healthy    Denies any known GI malignancies, IBD, or other GI diagnoses in the family.    PHYSICAL EXAMINATION:  Vitals There were no vitals  taken for this visit.   Wt   Wt Readings from Last 2 Encounters:   10/16/20 95.3 kg (210 lb)   10/16/20 95.7 kg (211 lb)   Video physical exam  General: Patient appears well in no acute distress. Normal body habitus.  Skin: No visualized rash or lesions on visualized skin  Eyes: EOMI, no erythema, sclera icterus or discharge noted  Resp: Appears to be breathing comfortably without accessory muscle usage, speaking in full sentences, no cough  MSK: Appears to have normal range of motion based on visualized movements  Neurologic: No apparent tremors, facial movements symmetric  Psych: affect normal, alert and oriented    The rest of a comprehensive physical examination is deferred due to PHE (public health emergency) video restrictions    PERTINENT STUDIES:  Orders Only on 06/04/2020   Component Date Value Ref Range Status     Lipase 06/04/2020 114  73 - 393 U/L Final     WBC 06/04/2020 5.5  4.0 - 11.0 10e9/L Final     RBC Count 06/04/2020 5.06  4.4 - 5.9 10e12/L Final     Hemoglobin 06/04/2020 14.8  13.3 - 17.7 g/dL Final     Hematocrit 06/04/2020 44.3  40.0 - 53.0 % Final     MCV 06/04/2020 88  78 - 100 fl Final     MCH 06/04/2020 29.2  26.5 - 33.0 pg Final     MCHC 06/04/2020 33.4  31.5 - 36.5 g/dL Final     RDW 06/04/2020 12.0  10.0 - 15.0 % Final     Platelet Count 06/04/2020 209  150 - 450 10e9/L Final     % Neutrophils 06/04/2020 60.9  % Final     % Lymphocytes 06/04/2020 27.3  % Final     % Monocytes 06/04/2020 9.8  % Final     % Eosinophils 06/04/2020 1.8  % Final     % Basophils 06/04/2020 0.2  % Final     Absolute Neutrophil 06/04/2020 3.3  1.6 - 8.3 10e9/L Final     Absolute Lymphocytes 06/04/2020 1.5  0.8 - 5.3 10e9/L Final     Absolute Monocytes 06/04/2020 0.5  0.0 - 1.3 10e9/L Final     Absolute Eosinophils 06/04/2020 0.1  0.0 - 0.7 10e9/L Final     Absolute Basophils 06/04/2020 0.0  0.0 - 0.2 10e9/L Final     Diff Method 06/04/2020 Automated Method   Final     Sodium 06/04/2020 138  133 - 144 mmol/L  "Final     Potassium 06/04/2020 4.1  3.4 - 5.3 mmol/L Final     Chloride 06/04/2020 105  94 - 109 mmol/L Final     Carbon Dioxide 06/04/2020 29  20 - 32 mmol/L Final     Anion Gap 06/04/2020 4  3 - 14 mmol/L Final     Glucose 06/04/2020 90  70 - 99 mg/dL Final     Urea Nitrogen 06/04/2020 16  7 - 30 mg/dL Final     Creatinine 06/04/2020 0.90  0.66 - 1.25 mg/dL Final     GFR Estimate 06/04/2020 >90  >60 mL/min/[1.73_m2] Final     GFR Estimate If Black 06/04/2020 >90  >60 mL/min/[1.73_m2] Final     Calcium 06/04/2020 9.5  8.5 - 10.1 mg/dL Final     Bilirubin Total 06/04/2020 0.7  0.2 - 1.3 mg/dL Final     Albumin 06/04/2020 4.0  3.4 - 5.0 g/dL Final     Protein Total 06/04/2020 7.6  6.8 - 8.8 g/dL Final     Alkaline Phosphatase 06/04/2020 68  40 - 150 U/L Final     ALT 06/04/2020 26  0 - 70 U/L Final     AST 06/04/2020 20  0 - 45 U/L Final         Wilber Hansel is a 50 year old male who is being evaluated via a billable video visit.      The patient has been notified of following:     \"This video visit will be conducted via a call between you and your physician/provider. We have found that certain health care needs can be provided without the need for an in-person physical exam.  This service lets us provide the care you need with a video conversation.  If a prescription is necessary we can send it directly to your pharmacy.  If lab work is needed we can place an order for that and you can then stop by our lab to have the test done at a later time.    Video visits are billed at different rates depending on your insurance coverage.  Please reach out to your insurance provider with any questions.    If during the course of the call the physician/provider feels a video visit is not appropriate, you will not be charged for this service.\"    Patient has given verbal consent for Video visit? Yes  How would you like to obtain your AVS? MyChart  If you are dropped from the video visit, the video invite should be resent to: Text " to cell phone: [phone]  Will anyone else be joining your video visit? No      Video-Visit Details    Type of service:  Video Visit    Video Start Time: 11:03 AM  Video End Time: 11:33 AM    Originating Location (pt. Location): Home    Distant Location (provider location):  [facility] [place] (provider's home)    Platform used for Video Visit: Des Christian PA-C

## 2020-12-21 NOTE — PATIENT INSTRUCTIONS
It was a pleasure taking care of you today.  I've included a brief summary of our discussion and care plan from today's visit below.  Please review this information with your primary care provider.  ______________________________________________________________________    My recommendations are summarized as follows:    -- re-start PPI with omeprazole 40 mg daily   -- take multi-vitamins   -- monitor symptoms     Return to GI Clinic in 2 months to review your progress.    ______________________________________________________________________    How do I schedule labs, imaging studies, or procedures that were ordered in clinic today?   Labs: To schedule lab appointment at the [facility], use my chart or call [phone]. If you have a [facility] lab closer to home where you are regularly seen you can give them a call.     Procedures: If a colonoscopy, upper endoscopy, breath test, esophageal manometry, or pH impedence was ordered today, our endoscopy team will call you to schedule this. If you have not heard from our endoscopy team within a week, please call [phone] to schedule.     Imaging Studies: If you were scheduled for a CT scan, X-ray, MRI, ultrasound, HIDA scan or other imaging study, please call [phone] to have this scheduled.     Referral: If a referral to another specialty was ordered, expect a phone call or follow instructions above. If you have not heard from anyone regarding your referral in a week, please call our clinic to check the status.     Who do I call with any questions after my visit?  Please be in touch if there are any further questions that arise following today's visit.  There are multiple ways to contact your gastroenterology care team.        During business hours, you may reach a Gastroenterology nurse at [phone]      To schedule or reschedule an appointment, please call [phone].       You can always send a secure message through [employer].  [employer]  messages are answered by your nurse or doctor typically within 24 hours.  Please allow extra time on weekends and holidays.        For urgent/emergent questions after business hours, you may reach the on-call GI Fellow by contacting the [facility] [place] [place]  at [phone].     How will I get the results of any tests ordered?    You will receive all of your results.  If you have signed up for [employer], any tests ordered at your visit will be available to you after your physician reviews them.  Typically this takes 1-2 weeks.  If there are urgent results that require a change in your care plan, your physician or nurse will call you to discuss the next steps.      What is [employer]?  [employer] is a secure way for you to access all of your healthcare records from the [facility].  It is a web based computer program, so you can sign on to it from any location.  It also allows you to send secure messages to your care team.  I recommend signing up for [employer] access if you have not already done so and are comfortable with using a computer.      How to I schedule a follow-up visit?  If you did not schedule a follow-up visit today, please call [phone] to schedule a follow-up office visit.      Sincerely,    Awilda Christian PA-C  Division of Gastroenterology, Hepatology & Nutrition  [facility]

## 2021-01-15 ENCOUNTER — HEALTH MAINTENANCE LETTER (OUTPATIENT)
Age: 51
End: 2021-01-15

## 2021-02-24 ENCOUNTER — VIRTUAL VISIT (OUTPATIENT)
Dept: GASTROENTEROLOGY | Facility: CLINIC | Age: 51
End: 2021-02-24
Payer: COMMERCIAL

## 2021-02-24 VITALS — BODY MASS INDEX: 27.59 KG/M2 | HEIGHT: 74 IN | WEIGHT: 215 LBS

## 2021-02-24 DIAGNOSIS — K30 FUNCTIONAL DYSPEPSIA: Primary | ICD-10-CM

## 2021-02-24 PROCEDURE — 99214 OFFICE O/P EST MOD 30 MIN: CPT | Mod: 95 | Performed by: PHYSICIAN ASSISTANT

## 2021-02-24 ASSESSMENT — MIFFLIN-ST. JEOR: SCORE: 1914.98

## 2021-02-24 NOTE — LETTER
"    2/24/2021       RE: Wilber Hamm  [address]  [place] [address]      Dear Colleague,    Thank you for referring your patient, Wilber Hamm, to the [facility]. Please see a copy of my visit note below.    Wilber Hamm is a 50 year old male who is being evaluated via a billable video visit.      The patient has been notified of following:     \"This video visit will be conducted via a call between you and your physician/provider. We have found that certain health care needs can be provided without the need for an in-person physical exam.  This service lets us provide the care you need with a video conversation.  If a prescription is necessary we can send it directly to your pharmacy.  If lab work is needed we can place an order for that and you can then stop by our lab to have the test done at a later time.    If during the course of the call the physician/provider feels a video visit is not appropriate, you will not be charged for this service.\"     Patient confirmed that they are in [place] for today's visit yes.    Video-Visit Details  Type of service:  Video Visit    Video Start Time: 11:08- 11:20, 11:21- 11:38     Originating Location (pt. Location): Home    Distant Location (provider location):  [facility]     Platform used: [facility]       GI CLINIC VISIT    CC/REFERRING MD:  Andrei Sharp  REASON FOR CONSULTATION:   Mr. Hamm is a 50 year old male who I was asked to see in consultation at the request of Dr. Andrei Sharp* for follow-up    ASSESSMENT/PLAN:  1. Epigastric pain   Onset of epigastric pain and bloating Spring 2020 in setting of longterm NSAID use with augmented use preceding symptom onset. H pylori was negative by stool testing x2. CT AP, CBC, CMP, lipase were unremarkable. He was started on PPI with variable compliance, however eventually took a 5-week course of omeprazole " 40 mg with resolution of epigastric pain. EGD with reactive gastropathy. 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     Tried nortriptyline with increased fatigue after one day. Will avoid at this time. No longer taking PPI and he has significant improvement in symptoms. Now most bothersome symptom is minor bloating.  We discussed that he can try Pepcid or famotidine as needed for breakthrough symptoms.  If this happens more frequently, may require intermittent courses of PPI for 2 weeks to 1 month at a time.  Will avoid TCA given his reported side effects though could consider alternative TCA if symptoms significantly worsen.      We also discussed his left-sided pain.  This seems to be musculoskeletal in nature as it is tender with palpation and localized to a specific region.  Reviewed previous CT scan, no abnormality that would correlate with this type of pain.  Discussed that he could try lidocaine patch to see if this helps.    Plan:   --You can use Pepcid as needed for breakthrough symptoms  --If you have multiple days of severe symptoms, it is okay to take a couple of weeks of a proton pump inhibitor.  Let us know  --For left-sided abdominal pain you can try over-the-counter lidocaine patches.  You can wear this for 12 hours, then take off for 12 hours.  Do not put heat over this area.  -- monitor symptoms     Follow-up in 4 months    33 Minutes was spent on the date of the encounter during chart review, history and exam, documentation, and further activities as noted      Note completed using voice recognition software. Some word and grammatical errors may occur.  Awilda Christian PA-C  Division of Gastroenterology, Hepatology & Nutrition  [facility]      HPI:   Mr. Hamm is a 50 year old male with prior panic attacks and s/p recent tooth extraction who is referred to GI clinic for follow-up for epigastric pain. He has previously been  seen by Dr. Weber.     Wilber noted onset of epigastric pain and bloating Spring 2020 in setting of longterm NSAID use with augmented use preceding symptom onset. H pylori was negative by stool testing x2. CBC, CMP, lipase were unremarkable.  CT scan 6/27/2020 when seen in the ED for dry socket- no source of epigastric pain. His symptoms were in parallel with a tooth extraction with resultant antibiotic use. He was prescribed PPI but did not take daily. He contnued to endorse upper abdominal tightness, borborygmi.     Interval history 10/16/2020:  Wilber reports that he took the omeprazole 40 mg daily for 5 weeks. long-term through that course, the epigastric/LUQ pain completely resolved. He continued to have a constant sensation of bloating, tightness, predominatnely in the upper abdomen, but can occur anywhere in the generalized abdomen. This did not improve on PPI. He has now been off PPI for 3 weeks without recurrence of pain, but with no change in bloating, tightness. These symptoms are not worsened in postprandial state and are grossly unchanged with bowel movements. Appetite comes and goes but able to complete meals, no early satiety. He has regained the weight that he initially lost at symptom onset. He denies any heartburn, reflux, odynophagia, dysphagia. Bowel pattern is reportedly at his baseline, 1-2 formed stools daily, rare missed days, no BRBPR or melena, no straining,no hard stools. Does sometimes feel incomplete evacuation, usually then followed by an additional bowel movement. No nausea, vomiting, increased flatulence. He chronically has a sore low back, tightness, which is unchanged. Otherwise, no new symptoms.    12/21/2020:  Had EGD-Mild erythema noted on endoscopy, biopsies notable for reactive gastropathy. Reports overall symptoms have improved significantly, but does continue to be bothered by symptoms. Discussed potentially starting TCA- he did not end up starting this.     Since the patient's  "last visit, he continued PPI. November 10th- 12/10. The last week to 10 days have been difficult again. A little more stomach discomfort, tightness/bloating, increased noise with bowel movements. Symptoms do not seem to be better for the last week to 10 days. Will have a period of a few hours. Discomfort is about the same as before. He has felt a little more \"quesy\" in the morning but not extreme nausea.     No symptoms at all with omeprazole.     Interval History 2/24/2021:   Today the patient reports the whole month of January he did not have any GI symptoms. The last 2-3 weeks he has had minor discomfort which he describes as a slight bloated feeling. Symptoms can come and go. No significant changes in diet around this time. No significant nausea, no vomiting.     Has not needed any medications for the past couple of months. He tried nortriptyline and this caused him to feel super fatigued with 1 capsule. Has not taken since.    Bowel movements are fine, energy levels are normal.      He will feel tightness and bloating. No diarrhea. Bloating does not change with bowel. Symptoms can last all day long.     Left sided rib pain toward the side/back of his flank. He is not sure if this is muscular. Does not seem associated with symptoms. Seems like a \"bruise\" of rib cage and is localized to a certain area. 1 day with minot regurgitation.    PERTINENT PAST MEDICAL HISTORY:  Panic attacks    PREVIOUS SURGERIES:  S/p tooth extraction May 2020    PREVIOUS ENDOSCOPY:  10/23/2020:  Impression:               - Z-line regular, 44 cm from the incisors.                             - Gastroesophageal flap valve classified as Hill                             Grade I (prominent fold, tight to endoscope).                             - Erythematous mucosa in the antrum. Biopsied.                             - Normal examined duodenum.   FINAL DIAGNOSIS:   Stomach, Antrum and Body, Biopsy:   Gastric antral & fundic mucosa with mild " reactive antral gastropathy   otherwise no significant histologic   abnormality; negative for intestinal metaplasia or dysplasia; no H. pylori    like organisms identified on   routine staining     ALLERGIES:   No Known Allergies    PERTINENT MEDICATIONS:  Current Outpatient Medications   Medication     hydrOXYzine (ATARAX) 25 MG tablet     nortriptyline (PAMELOR) 10 MG capsule     omeprazole (PRILOSEC) 40 MG DR capsule     No current facility-administered medications for this visit.    Probiotic  Janell  Omega three  Zinc  Cranberry supplement   Vitamin B12  (on average every other day)     SOCIAL HISTORY:  Social History     Socioeconomic History     Marital status: Single     Spouse name: Not on file     Number of children: Not on file     Years of education: Not on file     Highest education level: Not on file   Occupational History     Not on file   Social Needs     Financial resource strain: Not on file     Food insecurity     Worry: Not on file     Inability: Not on file     Transportation needs     Medical: Not on file     Non-medical: Not on file   Tobacco Use     Smoking status: Never Smoker     Smokeless tobacco: Never Used   Substance and Sexual Activity     Alcohol use: Not Currently     Drug use: Never     Sexual activity: Not Currently   Lifestyle     Physical activity     Days per week: Not on file     Minutes per session: Not on file     Stress: Not on file   Relationships     Social connections     Talks on phone: Not on file     Gets together: Not on file     Attends Congregation service: Not on file     Active member of club or organization: Not on file     Attends meetings of clubs or organizations: Not on file     Relationship status: Not on file     Intimate partner violence     Fear of current or ex partner: Not on file     Emotionally abused: Not on file     Physically abused: Not on file     Forced sexual activity: Not on file   Other Topics Concern     Not on file   Social History Narrative  "    Not on file       FAMILY HISTORY:  Family History   Problem Relation Age of Onset     Colon Cancer No family hx of      Colon Polyps No family hx of      Crohn's Disease No family hx of      Ulcerative Colitis No family hx of      Stomach Cancer No family hx of        Mother: reportedly healthy, may have had some issues with GERD  Father: DMT2  Siblings: reportedly all healthy    Denies any known GI malignancies, IBD, or other GI diagnoses in the family.    PHYSICAL EXAMINATION:  Vitals Ht 1.88 m (6' 2\")   Wt 97.5 kg (215 lb)   BMI 27.60 kg/m     Wt   Wt Readings from Last 2 Encounters:   02/24/21 97.5 kg (215 lb)   12/21/20 96.2 kg (212 lb)   Video physical exam  General: Patient appears well in no acute distress. Normal body habitus.  Skin: No visualized rash or lesions on visualized skin  Eyes: EOMI, no erythema, sclera icterus or discharge noted  Resp: Appears to be breathing comfortably without accessory muscle usage, speaking in full sentences, no cough  MSK: Appears to have normal range of motion based on visualized movements  Neurologic: No apparent tremors, facial movements symmetric  Psych: affect normal, alert and oriented    The rest of a comprehensive physical examination is deferred due to PHE (public health emergency) video restrictions    PERTINENT STUDIES:  Orders Only on 06/04/2020   Component Date Value Ref Range Status     Lipase 06/04/2020 114  73 - 393 U/L Final     WBC 06/04/2020 5.5  4.0 - 11.0 10e9/L Final     RBC Count 06/04/2020 5.06  4.4 - 5.9 10e12/L Final     Hemoglobin 06/04/2020 14.8  13.3 - 17.7 g/dL Final     Hematocrit 06/04/2020 44.3  40.0 - 53.0 % Final     MCV 06/04/2020 88  78 - 100 fl Final     MCH 06/04/2020 29.2  26.5 - 33.0 pg Final     MCHC 06/04/2020 33.4  31.5 - 36.5 g/dL Final     RDW 06/04/2020 12.0  10.0 - 15.0 % Final     Platelet Count 06/04/2020 209  150 - 450 10e9/L Final     % Neutrophils 06/04/2020 60.9  % Final     % Lymphocytes 06/04/2020 27.3  % Final "     % Monocytes 06/04/2020 9.8  % Final     % Eosinophils 06/04/2020 1.8  % Final     % Basophils 06/04/2020 0.2  % Final     Absolute Neutrophil 06/04/2020 3.3  1.6 - 8.3 10e9/L Final     Absolute Lymphocytes 06/04/2020 1.5  0.8 - 5.3 10e9/L Final     Absolute Monocytes 06/04/2020 0.5  0.0 - 1.3 10e9/L Final     Absolute Eosinophils 06/04/2020 0.1  0.0 - 0.7 10e9/L Final     Absolute Basophils 06/04/2020 0.0  0.0 - 0.2 10e9/L Final     Diff Method 06/04/2020 Automated Method   Final     Sodium 06/04/2020 138  133 - 144 mmol/L Final     Potassium 06/04/2020 4.1  3.4 - 5.3 mmol/L Final     Chloride 06/04/2020 105  94 - 109 mmol/L Final     Carbon Dioxide 06/04/2020 29  20 - 32 mmol/L Final     Anion Gap 06/04/2020 4  3 - 14 mmol/L Final     Glucose 06/04/2020 90  70 - 99 mg/dL Final     Urea Nitrogen 06/04/2020 16  7 - 30 mg/dL Final     Creatinine 06/04/2020 0.90  0.66 - 1.25 mg/dL Final     GFR Estimate 06/04/2020 >90  >60 mL/min/[1.73_m2] Final     GFR Estimate If Black 06/04/2020 >90  >60 mL/min/[1.73_m2] Final     Calcium 06/04/2020 9.5  8.5 - 10.1 mg/dL Final     Bilirubin Total 06/04/2020 0.7  0.2 - 1.3 mg/dL Final     Albumin 06/04/2020 4.0  3.4 - 5.0 g/dL Final     Protein Total 06/04/2020 7.6  6.8 - 8.8 g/dL Final     Alkaline Phosphatase 06/04/2020 68  40 - 150 U/L Final     ALT 06/04/2020 26  0 - 70 U/L Final     AST 06/04/2020 20  0 - 45 U/L Final       Again, thank you for allowing me to participate in the care of your patient.      Sincerely,    Awilda Christian PA-C

## 2021-02-24 NOTE — NURSING NOTE
"Chief Complaint   Patient presents with     Follow Up     4 weeks follow up       Vitals:    02/24/21 1035   Weight: 97.5 kg (215 lb)   Height: 1.88 m (6' 2\")       Body mass index is 27.6 kg/m .    Chela Edwards CMA    "

## 2021-02-24 NOTE — PROGRESS NOTES
"Wilber Hamm is a 50 year old male who is being evaluated via a billable video visit.      The patient has been notified of following:     \"This video visit will be conducted via a call between you and your physician/provider. We have found that certain health care needs can be provided without the need for an in-person physical exam.  This service lets us provide the care you need with a video conversation.  If a prescription is necessary we can send it directly to your pharmacy.  If lab work is needed we can place an order for that and you can then stop by our lab to have the test done at a later time.    If during the course of the call the physician/provider feels a video visit is not appropriate, you will not be charged for this service.\"     Patient confirmed that they are in [place] for today's visit yes.    Video-Visit Details  Type of service:  Video Visit    Video Start Time: 11:08- 11:20, 11:21- 11:38     Originating Location (pt. Location): [place]    Distant Location (provider location):  [facility]STRO[facility] [place]     Platform used: [facility]     GI CLINIC VISIT    CC/REFERRING MD:  Andrei Sharp  REASON FOR CONSULTATION:   Mr. Hamm is a 50 year old male who I was asked to see in consultation at the request of Dr. Andrei Sharp* for follow-up    ASSESSMENT/PLAN:  1. Epigastric pain   Onset of epigastric pain and bloating Spring 2020 in setting of longterm NSAID use with augmented use preceding symptom onset. H pylori was negative by stool testing x2. CT AP, CBC, CMP, lipase were unremarkable. He was started on PPI with variable compliance, however eventually took a 5-week course of omeprazole 40 mg with resolution of epigastric pain. EGD with reactive gastropathy. 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     Tried nortriptyline with increased fatigue after one day. Will avoid at this time. No " longer taking PPI and he has significant improvement in symptoms. Now most bothersome symptom is minor bloating.  We discussed that he can try Pepcid or famotidine as needed for breakthrough symptoms.  If this happens more frequently, may require intermittent courses of PPI for 2 weeks to 1 month at a time.  Will avoid TCA given his reported side effects though could consider alternative TCA if symptoms significantly worsen.      We also discussed his left-sided pain.  This seems to be musculoskeletal in nature as it is tender with palpation and localized to a specific region.  Reviewed previous CT scan, no abnormality that would correlate with this type of pain.  Discussed that he could try lidocaine patch to see if this helps.    Plan:   --You can use Pepcid as needed for breakthrough symptoms  --If you have multiple days of severe symptoms, it is okay to take a couple of weeks of a proton pump inhibitor.  Let us know  --For left-sided abdominal pain you can try over-the-counter lidocaine patches.  You can wear this for 12 hours, then take off for 12 hours.  Do not put heat over this area.  -- monitor symptoms     Follow-up in 4 months    33 Minutes was spent on the date of the encounter during chart review, history and exam, documentation, and further activities as noted      Note completed using voice recognition software. Some word and grammatical errors may occur.  Awilda Christian PA-C  Division of Gastroenterology, Hepatology & Nutrition  [facility]      HPI:   Mr. Hamm is a 50 year old male with prior panic attacks and s/p recent tooth extraction who is referred to GI clinic for follow-up for epigastric pain. He has previously been seen by Dr. Weber.     Wilber noted onset of epigastric pain and bloating Spring 2020 in setting of longterm NSAID use with augmented use preceding symptom onset. H pylori was negative by stool testing x2. CBC, CMP, lipase were unremarkable.  CT scan 6/27/2020  when seen in the ED for dry socket- no source of epigastric pain. His symptoms were in parallel with a tooth extraction with resultant antibiotic use. He was prescribed PPI but did not take daily. He contnued to endorse upper abdominal tightness, borborygmi.     Interval history 10/16/2020:  Wilber reports that he took the omeprazole 40 mg daily for 5 weeks. [place] through that course, the epigastric/LUQ pain completely resolved. He continued to have a constant sensation of bloating, tightness, predominatnely in the upper abdomen, but can occur anywhere in the generalized abdomen. This did not improve on PPI. He has now been off PPI for 3 weeks without recurrence of pain, but with no change in bloating, tightness. These symptoms are not worsened in postprandial state and are grossly unchanged with bowel movements. Appetite comes and goes but able to complete meals, no early satiety. He has regained the weight that he initially lost at symptom onset. He denies any heartburn, reflux, odynophagia, dysphagia. Bowel pattern is reportedly at his baseline, 1-2 formed stools daily, rare missed days, no BRBPR or melena, no straining,no hard stools. Does sometimes feel incomplete evacuation, usually then followed by an additional bowel movement. No nausea, vomiting, increased flatulence. He chronically has a sore low back, tightness, which is unchanged. Otherwise, no new symptoms.    12/21/2020:  Had EGD-Mild erythema noted on endoscopy, biopsies notable for reactive gastropathy. Reports overall symptoms have improved significantly, but does continue to be bothered by symptoms. Discussed potentially starting TCA- he did not end up starting this.     Since the patient's last visit, he continued PPI. November 10th- 12/10. The last week to 10 days have been difficult again. A little more stomach discomfort, tightness/bloating, increased noise with bowel movements. Symptoms do not seem to be better for the last week to 10 days.  "Will have a period of a few hours. Discomfort is about the same as before. He has felt a little more \"quesy\" in the morning but not extreme nausea.     No symptoms at all with omeprazole.     Interval History 2/24/2021:   Today the patient reports the whole month of January he did not have any GI symptoms. The last 2-3 weeks he has had minor discomfort which he describes as a slight bloated feeling. Symptoms can come and go. No significant changes in diet around this time. No significant nausea, no vomiting.     Has not needed any medications for the past couple of months. He tried nortriptyline and this caused him to feel super fatigued with 1 capsule. Has not taken since.    Bowel movements are fine, energy levels are normal.      He will feel tightness and bloating. No diarrhea. Bloating does not change with bowel. Symptoms can last all day long.     Left sided rib pain toward the side/back of his flank. He is not sure if this is muscular. Does not seem associated with symptoms. Seems like a \"bruise\" of rib cage and is localized to a certain area. 1 day with minot regurgitation.    PERTINENT PAST MEDICAL HISTORY:  Panic attacks    PREVIOUS SURGERIES:  S/p tooth extraction May 2020    PREVIOUS ENDOSCOPY:  10/23/2020:  Impression:               - Z-line regular, 44 cm from the incisors.                             - Gastroesophageal flap valve classified as Hill                             Grade I (prominent fold, tight to endoscope).                             - Erythematous mucosa in the antrum. Biopsied.                             - Normal examined duodenum.   FINAL DIAGNOSIS:   Stomach, Antrum and Body, Biopsy:   Gastric antral & fundic mucosa with mild reactive antral gastropathy   otherwise no significant histologic   abnormality; negative for intestinal metaplasia or dysplasia; no H. pylori    like organisms identified on   routine staining     ALLERGIES:   No Known Allergies    PERTINENT " MEDICATIONS:  Current Outpatient Medications   Medication     hydrOXYzine (ATARAX) 25 MG tablet     nortriptyline (PAMELOR) 10 MG capsule     omeprazole (PRILOSEC) 40 MG DR capsule     No current facility-administered medications for this visit.    Probiotic  Janell  Omega three  Zinc  Cranberry supplement   Vitamin B12  (on average every other day)     SOCIAL HISTORY:  Social History     Socioeconomic History     Marital status: Single     Spouse name: Not on file     Number of children: Not on file     Years of education: Not on file     Highest education level: Not on file   Occupational History     Not on file   Social Needs     Financial resource strain: Not on file     Food insecurity     Worry: Not on file     Inability: Not on file     Transportation needs     Medical: Not on file     Non-medical: Not on file   Tobacco Use     Smoking status: Never Smoker     Smokeless tobacco: Never Used   Substance and Sexual Activity     Alcohol use: Not Currently     Drug use: Never     Sexual activity: Not Currently   Lifestyle     Physical activity     Days per week: Not on file     Minutes per session: Not on file     Stress: Not on file   Relationships     Social connections     Talks on phone: Not on file     Gets together: Not on file     Attends Protestant service: Not on file     Active member of club or organization: Not on file     Attends meetings of clubs or organizations: Not on file     Relationship status: Not on file     Intimate partner violence     Fear of current or ex partner: Not on file     Emotionally abused: Not on file     Physically abused: Not on file     Forced sexual activity: Not on file   Other Topics Concern     Not on file   Social History Narrative     Not on file       FAMILY HISTORY:  Family History   Problem Relation Age of Onset     Colon Cancer No family hx of      Colon Polyps No family hx of      Crohn's Disease No family hx of      Ulcerative Colitis No family hx of      Stomach  "Cancer No family hx of        Mother: reportedly healthy, may have had some issues with GERD  Father: DMT2  Siblings: reportedly all healthy    Denies any known GI malignancies, IBD, or other GI diagnoses in the family.    PHYSICAL EXAMINATION:  Vitals Ht 1.88 m (6' 2\")   Wt 97.5 kg (215 lb)   BMI 27.60 kg/m     Wt   Wt Readings from Last 2 Encounters:   02/24/21 97.5 kg (215 lb)   12/21/20 96.2 kg (212 lb)   Video physical exam  General: Patient appears well in no acute distress. Normal body habitus.  Skin: No visualized rash or lesions on visualized skin  Eyes: EOMI, no erythema, sclera icterus or discharge noted  Resp: Appears to be breathing comfortably without accessory muscle usage, speaking in full sentences, no cough  MSK: Appears to have normal range of motion based on visualized movements  Neurologic: No apparent tremors, facial movements symmetric  Psych: affect normal, alert and oriented    The rest of a comprehensive physical examination is deferred due to PHE (public health emergency) video restrictions    PERTINENT STUDIES:  Orders Only on 06/04/2020   Component Date Value Ref Range Status     Lipase 06/04/2020 114  73 - 393 U/L Final     WBC 06/04/2020 5.5  4.0 - 11.0 10e9/L Final     RBC Count 06/04/2020 5.06  4.4 - 5.9 10e12/L Final     Hemoglobin 06/04/2020 14.8  13.3 - 17.7 g/dL Final     Hematocrit 06/04/2020 44.3  40.0 - 53.0 % Final     MCV 06/04/2020 88  78 - 100 fl Final     MCH 06/04/2020 29.2  26.5 - 33.0 pg Final     MCHC 06/04/2020 33.4  31.5 - 36.5 g/dL Final     RDW 06/04/2020 12.0  10.0 - 15.0 % Final     Platelet Count 06/04/2020 209  150 - 450 10e9/L Final     % Neutrophils 06/04/2020 60.9  % Final     % Lymphocytes 06/04/2020 27.3  % Final     % Monocytes 06/04/2020 9.8  % Final     % Eosinophils 06/04/2020 1.8  % Final     % Basophils 06/04/2020 0.2  % Final     Absolute Neutrophil 06/04/2020 3.3  1.6 - 8.3 10e9/L Final     Absolute Lymphocytes 06/04/2020 1.5  0.8 - 5.3 10e9/L " Final     Absolute Monocytes 06/04/2020 0.5  0.0 - 1.3 10e9/L Final     Absolute Eosinophils 06/04/2020 0.1  0.0 - 0.7 10e9/L Final     Absolute Basophils 06/04/2020 0.0  0.0 - 0.2 10e9/L Final     Diff Method 06/04/2020 Automated Method   Final     Sodium 06/04/2020 138  133 - 144 mmol/L Final     Potassium 06/04/2020 4.1  3.4 - 5.3 mmol/L Final     Chloride 06/04/2020 105  94 - 109 mmol/L Final     Carbon Dioxide 06/04/2020 29  20 - 32 mmol/L Final     Anion Gap 06/04/2020 4  3 - 14 mmol/L Final     Glucose 06/04/2020 90  70 - 99 mg/dL Final     Urea Nitrogen 06/04/2020 16  7 - 30 mg/dL Final     Creatinine 06/04/2020 0.90  0.66 - 1.25 mg/dL Final     GFR Estimate 06/04/2020 >90  >60 mL/min/[1.73_m2] Final     GFR Estimate If Black 06/04/2020 >90  >60 mL/min/[1.73_m2] Final     Calcium 06/04/2020 9.5  8.5 - 10.1 mg/dL Final     Bilirubin Total 06/04/2020 0.7  0.2 - 1.3 mg/dL Final     Albumin 06/04/2020 4.0  3.4 - 5.0 g/dL Final     Protein Total 06/04/2020 7.6  6.8 - 8.8 g/dL Final     Alkaline Phosphatase 06/04/2020 68  40 - 150 U/L Final     ALT 06/04/2020 26  0 - 70 U/L Final     AST 06/04/2020 20  0 - 45 U/L Final

## 2021-02-24 NOTE — PATIENT INSTRUCTIONS
It was a pleasure taking care of you today.  I've included a brief summary of our discussion and care plan from today's visit below.  Please review this information with your primary care provider.  ______________________________________________________________________    My recommendations are summarized as follows:    --You can use Pepcid as needed for breakthrough symptoms  --If you have multiple days of severe symptoms, it is okay to take a couple of weeks of a proton pump inhibitor.  Let us know  --For left-sided abdominal pain you can try over-the-counter lidocaine patches.  You can wear this for 12 hours, then take off for 12 hours.  Do not put heat over this area.  -- monitor symptoms       Return to GI Clinic in 4 months to review your progress.    ______________________________________________________________________    How do I schedule labs, imaging studies, or procedures that were ordered in clinic today?   Labs: To schedule lab appointment at the [facility], use my chart or call [phone]. If you have a [facility] lab closer to home where you are regularly seen you can give them a call.     Procedures: If a colonoscopy, upper endoscopy, breath test, esophageal manometry, or pH impedence was ordered today, our endoscopy team will call you to schedule this. If you have not heard from our endoscopy team within a week, please call [phone] to schedule.     Imaging Studies: If you were scheduled for a CT scan, X-ray, MRI, ultrasound, HIDA scan or other imaging study, please call [phone] to have this scheduled.     Referral: If a referral to another specialty was ordered, expect a phone call or follow instructions above. If you have not heard from anyone regarding your referral in a week, please call our clinic to check the status.     Who do I call with any questions after my visit?  Please be in touch if there are any further questions that arise following today's visit.  There  are multiple ways to contact your gastroenterology care team.        During business hours, you may reach a Gastroenterology nurse at [phone]      To schedule or reschedule an appointment, please call [phone].       You can always send a secure message through HERMEL DELOR.  HERMEL [employer] messages are answered by your nurse or doctor typically within 24 hours.  Please allow extra time on weekends and holidays.        For urgent/emergent questions after business hours, you may reach the on-call GI Fellow by contacting the [facility] at [phone].     How will I get the results of any tests ordered?    You will receive all of your results.  If you have signed up for qcuet, any tests ordered at your visit will be available to you after your physician reviews them.  Typically this takes 1-2 weeks.  If there are urgent results that require a change in your care plan, your physician or nurse will call you to discuss the next steps.      What is HERMEL DELOR?  HERMEL DELOR is a secure way for you to access all of your healthcare records from the [facility].  It is a web based computer program, so you can sign on to it from any location.  It also allows you to send secure messages to your care team.  I recommend signing up for HERMEL DELOR access if you have not already done so and are comfortable with using a computer.      How to I schedule a follow-up visit?  If you did not schedule a follow-up visit today, please call [phone] to schedule a follow-up office visit.      Sincerely,    Awilda Christian PA-C  Division of Gastroenterology, Hepatology & Nutrition  [facility]

## 2021-04-29 ENCOUNTER — IMMUNIZATION (OUTPATIENT)
Dept: NURSING | Facility: CLINIC | Age: 51
End: 2021-04-29
Payer: COMMERCIAL

## 2021-04-29 PROCEDURE — 91300 PR COVID VAC PFIZER DIL RECON 30 MCG/0.3 ML IM: CPT

## 2021-04-29 PROCEDURE — 0001A PR COVID VAC PFIZER DIL RECON 30 MCG/0.3 ML IM: CPT

## 2021-05-25 ENCOUNTER — IMMUNIZATION (OUTPATIENT)
Dept: NURSING | Facility: CLINIC | Age: 51
End: 2021-05-25
Attending: INTERNAL MEDICINE
Payer: COMMERCIAL

## 2021-05-25 PROCEDURE — 0002A PR COVID VAC PFIZER DIL RECON 30 MCG/0.3 ML IM: CPT

## 2021-05-25 PROCEDURE — 91300 PR COVID VAC PFIZER DIL RECON 30 MCG/0.3 ML IM: CPT

## 2021-09-05 ENCOUNTER — HEALTH MAINTENANCE LETTER (OUTPATIENT)
Age: 51
End: 2021-09-05

## 2021-11-16 ENCOUNTER — OFFICE VISIT (OUTPATIENT)
Dept: FAMILY MEDICINE | Facility: CLINIC | Age: 51
End: 2021-11-16
Payer: COMMERCIAL

## 2021-11-16 VITALS
TEMPERATURE: 99.7 F | SYSTOLIC BLOOD PRESSURE: 129 MMHG | DIASTOLIC BLOOD PRESSURE: 81 MMHG | HEART RATE: 87 BPM | BODY MASS INDEX: 28 KG/M2 | HEIGHT: 74 IN | WEIGHT: 218.2 LBS | OXYGEN SATURATION: 98 %

## 2021-11-16 DIAGNOSIS — Z23 NEED FOR PROPHYLACTIC VACCINATION AND INOCULATION AGAINST INFLUENZA: ICD-10-CM

## 2021-11-16 DIAGNOSIS — L03.211 CELLULITIS OF FACE: Primary | ICD-10-CM

## 2021-11-16 PROCEDURE — 90686 IIV4 VACC NO PRSV 0.5 ML IM: CPT | Performed by: NURSE PRACTITIONER

## 2021-11-16 PROCEDURE — 99213 OFFICE O/P EST LOW 20 MIN: CPT | Mod: 25 | Performed by: NURSE PRACTITIONER

## 2021-11-16 PROCEDURE — 90471 IMMUNIZATION ADMIN: CPT | Performed by: NURSE PRACTITIONER

## 2021-11-16 RX ORDER — CEPHALEXIN 500 MG/1
500 CAPSULE ORAL 4 TIMES DAILY
Qty: 40 CAPSULE | Refills: 0 | Status: SHIPPED | OUTPATIENT
Start: 2021-11-16 | End: 2021-11-26

## 2021-11-16 ASSESSMENT — MIFFLIN-ST. JEOR: SCORE: 1924.75

## 2021-11-16 NOTE — PATIENT INSTRUCTIONS
Patient Education     Facial Cellulitis  Cellulitis is an infection of the deep layers of skin. A break in the skin, such as a cut or scratch, can let bacteria under the skin. It may also occur from an infected oil gland (pimple) or hair follicle. If the bacteria get to deep layers of the skin, it can be serious. If not treated, cellulitis can get into the bloodstream and lymph nodes. The infection can then spread throughout the body. This causes serious illness. Cellulitis on the face is especially dangerous if it affects the skin around the eyes.   Cellulitis causes the affected skin to become red, swollen, warm, and sore. The reddened areas have a visible border. You may have a fever, chills, and pain.   Cellulitis is treated with antibiotics taken for 7 to 10 days. Symptoms should get better 1 to 2 days after treatment is started. Make sure to take all the antibiotics for the full number of days until they are gone. Keep taking the medicine even if your symptoms go away.   Home care  Follow these tips:    Take all of the antibiotic medicine exactly as directed until it's gone. Don t miss any doses, especially during the first 7 days. Don t stop taking it when your symptoms get better.    Use a cool compress (face cloth soaked in cool water) on your face to help reduce swelling and pain.    You may use acetaminophen or ibuprofen to reduce pain. Don t use these if you have chronic liver or kidney disease, or ever had a stomach ulcer or gastrointestinal bleeding. Talk with your healthcare provider first.  Follow-up care  Follow up with your healthcare provider, or as advised. If your infection doesn't go away on the first antibiotic, your healthcare provider will prescribe a different one.   When to seek medical advice  Call your healthcare provider right away if any of these occur:    Fever higher of 100.4  F (38.0  C) or higher after 2 days on antibiotics    Red areas that spread    Swelling or pain that gets  worse    Fluid leaking from the skin (pus)    An eyelid that swells shut or leaks fluid (pus)    Headache or neck pain that gets worse    Unusual drowsiness or confusion    Seizure    Change in eyesight  [place] last reviewed this educational content on 8/1/2019 2000-2021 [employer]. All rights reserved. This information is not intended as a substitute for professional medical care. Always follow your healthcare professional's instructions.

## 2021-11-16 NOTE — PROGRESS NOTES
"  Assessment & Plan     Cellulitis of face  This is a recurrent lesion that was initially treated with Keflex and resolved.  Will treat with Keflex again and send to Dermatology to ensure no concerning skin lesion/abnormality.  - cephALEXin (KEFLEX) 500 MG capsule  Dispense: 40 capsule; Refill: 0  - Adult Dermatology Referral    Need for prophylactic vaccination and inoculation against influenza    - INFLUENZA VACCINE IM > 6 MONTHS VALENT IIV4 (AFLURIA/FLUZONE)      Ordering of each unique test  Prescription drug management  22 minutes spent on the date of the encounter doing chart review, history and exam, documentation and further activities per the note       BMI:   Estimated body mass index is 28 kg/m  as calculated from the following:    Height as of this encounter: 1.88 m (6' 2.02\").    Weight as of this encounter: 99 kg (218 lb 3.2 oz).   Weight management plan: Discussed healthy diet and exercise guidelines    Work on weight loss  Regular exercise  See Patient Instructions    Return in about 2 weeks (around 11/30/2021), or if symptoms worsen or fail to improve, for Follow up.    PLACIDO Erickson [facility] TIA Merino is a 51 year old who presents for the following health issues     HPI     Patient has erythematous, warm, mildly tender  Area on tip of nose that he's had before and was treated with Keflex which resolved his symptoms. He notes current symptoms for the last 2 days and is concerned that they are recurrent, in the same place. He denies underlying skin lesion, no history of skin cancer, no injury/trauma, no purulent nasal discxharge, no nasal sores/lesions.      Review of Systems   Constitutional, HEENT, cardiovascular, pulmonary, gi and gu systems are negative, except as otherwise noted.      Objective    /81 (BP Location: Left arm, Patient Position: Sitting, Cuff Size: Adult Large)   Pulse 87   Temp 99.7  F (37.6  C) (Tympanic)   Ht 1.88 " "m (6' 2.02\")   Wt 99 kg (218 lb 3.2 oz)   SpO2 98%   BMI 28.00 kg/m    Body mass index is 28 kg/m .  Physical Exam   GENERAL: healthy, alert and no distress  EYES: Eyes grossly normal to inspection, PERRL and conjunctivae and sclerae normal  HENT: normal cephalic/atraumatic, ear canals and TM's normal, 1 X 0.5cm erythematous warm, tender area on tip of nose, otherwise, nose and mouth without ulcers or lesions, oropharynx clear and oral mucous membranes moist, without lesions, tenderness  NECK: no adenopathy, no asymmetry, masses, or scars and thyroid normal to palpation  RESP: lungs clear to auscultation - no rales, rhonchi or wheezes  CV: regular rate and rhythm, normal S1 S2, no S3 or S4, no murmur, click or rub, no peripheral edema and peripheral pulses strong  MS: no gross musculoskeletal defects noted, no edema  PSYCH: mentation appears normal, affect normal/bright  LYMPH: no cervical adenopathy            Answers for HPI/ROS submitted by the patient on 11/16/2021  How many servings of fruits and vegetables do you eat daily?: 2-3  On average, how many sweetened beverages do you drink each day (Examples: soda, juice, sweet tea, etc.  Do NOT count diet or artificially sweetened beverages)?: 0  How many minutes a day do you exercise enough to make your heart beat faster?: 30 to 60  How many days a week do you exercise enough to make your heart beat faster?: 5  How many days per week do you miss taking your medication?: 0      "

## 2021-11-23 ENCOUNTER — OFFICE VISIT (OUTPATIENT)
Dept: DERMATOLOGY | Facility: CLINIC | Age: 51
End: 2021-11-23
Attending: NURSE PRACTITIONER
Payer: COMMERCIAL

## 2021-11-23 DIAGNOSIS — R23.8 INFLAMMATORY PAPULE: Primary | ICD-10-CM

## 2021-11-23 DIAGNOSIS — L03.211 CELLULITIS OF FACE: ICD-10-CM

## 2021-11-23 DIAGNOSIS — L71.9 ROSACEA: ICD-10-CM

## 2021-11-23 PROCEDURE — 99203 OFFICE O/P NEW LOW 30 MIN: CPT | Mod: GC | Performed by: DERMATOLOGY

## 2021-11-23 RX ORDER — DOXYCYCLINE 100 MG/1
100 CAPSULE ORAL 2 TIMES DAILY
Qty: 60 CAPSULE | Refills: 0 | Status: SHIPPED | OUTPATIENT
Start: 2021-11-23 | End: 2021-12-23

## 2021-11-23 ASSESSMENT — PAIN SCALES - GENERAL: PAINLEVEL: MILD PAIN (2)

## 2021-11-23 NOTE — LETTER
11/23/2021       RE: Wilber Hamm  [address] [place] [address]     Dear Colleague,    Thank you for referring your patient, Wilber Hamm, to the [facility] [place] at [facility]. Please see a copy of my visit note below.    [facility] Dermatology Note  Encounter Date: Nov 23, 2021  Office Visit     Dermatology Problem List:  1. Inflammatory papule - superior nasal tip  - doxycycline 100 mg BID x 1 mo  - bx if not resolved    ____________________________________________    Assessment & Plan:    # Likely inflammatory papule - nasal dorsum.  Question rosacea spectrum disease vs folliculitis with e/o follicular occlusion. Gauze test negative. Ultimately favor inflammatory papule given responsiveness to abx. Discussed trial doxycycline x 1mo. If not responsive would bx to r/o NMSC.    - Start doxycycline 100 mg BID x 1 mo - reviewed risks including heartburn, need to remain upright after taking, photosensitivity - handout given  - Stop keflex  - Patient to message if not responding to the above and will overbook for consideration of biopsy     Procedures Performed:   None    Follow-up: prn for new or changing lesions; ok to overbook if patient calls for appt.    Staff and Scribe:     Scribe Disclosure:  I, Nikhil Lucio, am serving as a scribe to document services personally performed by Tramaine Brown MD based on data collection and the provider's statements to me.     This patient was staffed with Dr. Brown.    Norberto Hays MD  Internal Medicine - Dermatology PGY 4    Staff Physician Comments:   I saw and evaluated the patient with the resident and I agree with the assessment and plan.  I was present for the examination. I have made edits if needed.    Tramaine Brown MD  Staff Dermatologist and Dermatopathologist  , Department of  Dermatology    ____________________________________________    CC: No chief complaint on file.    HPI:  Mr. Wilber Hamm is a(n) 51 year old male who presents today as a new patient for red bump on nose  - Onset spring / early summer after returning from vacation with pool  - Completed course of keflex which improved things  - Recurred and tender last week  - Now again on keflex and no longer tender and improving  - No personal hx of skin cancer  - Patient is otherwise feeling well, without additional skin concerns.    Labs Reviewed:  N/A    Physical Exam:  SKIN: Focused examination of face was performed.  - few superficial telangiectatic elements on central face  - superficially eroded pink papule on nasal dorsum - overall, very thin and single erosion on dermoscopy without other concerning features - gauze test negative  - No other lesions of concern on areas examined.     Medications:  Current Outpatient Medications   Medication     cephALEXin (KEFLEX) 500 MG capsule     hydrOXYzine (ATARAX) 25 MG tablet     omeprazole (PRILOSEC) 40 MG DR capsule     No current facility-administered medications for this visit.      Past Medical History:   Patient Active Problem List   Diagnosis     Epigastric pain     Dyspepsia     Elevated blood pressure reading without diagnosis of hypertension     Overweight with body mass index (BMI) of 26 to 26.9 in adult     Right bundle branch block     Past Medical History:   Diagnosis Date     Gastroesophageal reflux disease         CC PLACIDO Menjivar CNP  [address] [phone] on close of this encounter.

## 2021-11-23 NOTE — PROGRESS NOTES
[facility] Dermatology Note  Encounter Date: Nov 23, 2021  Office Visit     Dermatology Problem List:  1. Inflammatory papule - superior nasal tip  - doxycycline 100 mg BID x 1 mo  - bx if not resolved    ____________________________________________    Assessment & Plan:    # Likely inflammatory papule - nasal dorsum.  Question rosacea spectrum disease vs folliculitis with e/o follicular occlusion. Gauze test negative. Ultimately favor inflammatory papule given responsiveness to abx. Discussed trial doxycycline x 1mo. If not responsive would bx to r/o NMSC.    - Start doxycycline 100 mg BID x 1 mo - reviewed risks including heartburn, need to remain upright after taking, photosensitivity - handout given  - Stop keflex  - Patient to message if not responding to the above and will overbook for consideration of biopsy     Procedures Performed:   None    Follow-up: prn for new or changing lesions; ok to overbook if patient calls for appt.    Staff and Scribe:     Scribe Disclosure:  I, Nikhil Lucio, am serving as a scribe to document services personally performed by Tramaine Brown MD based on data collection and the provider's statements to me.     This patient was staffed with Dr. Brown.    Norberto Hays MD  Internal Medicine - Dermatology PGY 4    Staff Physician Comments:   I saw and evaluated the patient with the resident and I agree with the assessment and plan.  I was present for the examination. I have made edits if needed.    Tramaine Brown MD  Staff Dermatologist and Dermatopathologist  , Department of Dermatology    ____________________________________________    CC: No chief complaint on file.    HPI:  Mr. Wilber Hamm is a(n) 51 year old male who presents today as a new patient for red bump on nose  - Onset spring / early summer after returning from vacation with pool  - Completed course of keflex which improved things  - Recurred and tender last week  -  Now again on keflex and no longer tender and improving  - No personal hx of skin cancer  - Patient is otherwise feeling well, without additional skin concerns.    Labs Reviewed:  N/A    Physical Exam:  SKIN: Focused examination of face was performed.  - few superficial telangiectatic elements on central face  - superficially eroded pink papule on nasal dorsum - overall, very thin and single erosion on dermoscopy without other concerning features - gauze test negative  - No other lesions of concern on areas examined.     Medications:  Current Outpatient Medications   Medication     cephALEXin (KEFLEX) 500 MG capsule     hydrOXYzine (ATARAX) 25 MG tablet     omeprazole (PRILOSEC) 40 MG DR capsule     No current facility-administered medications for this visit.      Past Medical History:   Patient Active Problem List   Diagnosis     Epigastric pain     Dyspepsia     Elevated blood pressure reading without diagnosis of hypertension     Overweight with body mass index (BMI) of 26 to 26.9 in adult     Right bundle branch block     Past Medical History:   Diagnosis Date     Gastroesophageal reflux disease         CC PLACIDO Menjivar CNP  [id number] [phone] on close of this encounter.

## 2022-01-24 ENCOUNTER — IMMUNIZATION (OUTPATIENT)
Dept: NURSING | Facility: CLINIC | Age: 52
End: 2022-01-24
Payer: COMMERCIAL

## 2022-01-24 PROCEDURE — 91305 COVID-19,PF,PFIZER (12+ YRS): CPT

## 2022-01-24 PROCEDURE — 0054A COVID-19,PF,PFIZER (12+ YRS): CPT

## 2022-02-20 ENCOUNTER — HEALTH MAINTENANCE LETTER (OUTPATIENT)
Age: 52
End: 2022-02-20

## 2022-09-08 ENCOUNTER — OFFICE VISIT (OUTPATIENT)
Dept: URGENT CARE | Facility: URGENT CARE | Age: 52
End: 2022-09-08
Payer: COMMERCIAL

## 2022-09-08 VITALS
TEMPERATURE: 97.1 F | OXYGEN SATURATION: 96 % | SYSTOLIC BLOOD PRESSURE: 134 MMHG | WEIGHT: 216 LBS | DIASTOLIC BLOOD PRESSURE: 80 MMHG | BODY MASS INDEX: 27.72 KG/M2 | HEART RATE: 81 BPM

## 2022-09-08 DIAGNOSIS — M54.50 ACUTE BILATERAL LOW BACK PAIN WITHOUT SCIATICA: Primary | ICD-10-CM

## 2022-09-08 PROCEDURE — 99213 OFFICE O/P EST LOW 20 MIN: CPT | Performed by: PHYSICIAN ASSISTANT

## 2022-09-08 RX ORDER — METHOCARBAMOL 500 MG/1
500 TABLET, FILM COATED ORAL 4 TIMES DAILY PRN
Qty: 30 TABLET | Refills: 0 | Status: SHIPPED | OUTPATIENT
Start: 2022-09-08

## 2022-09-08 RX ORDER — PREDNISONE 20 MG/1
20 TABLET ORAL 2 TIMES DAILY
Qty: 14 TABLET | Refills: 0 | Status: SHIPPED | OUTPATIENT
Start: 2022-09-08 | End: 2022-09-15

## 2022-09-08 ASSESSMENT — ENCOUNTER SYMPTOMS
CHEST TIGHTNESS: 0
MYALGIAS: 1
COUGH: 0
NECK STIFFNESS: 0
WHEEZING: 0
FATIGUE: 0
VOMITING: 0
SHORTNESS OF BREATH: 0
WOUND: 0
FEVER: 0
CHILLS: 0
CONSTITUTIONAL NEGATIVE: 1
ARTHRALGIAS: 1
JOINT SWELLING: 0
HEMATURIA: 0
BACK PAIN: 1
ABDOMINAL PAIN: 0
DYSURIA: 0
NECK PAIN: 0
NAUSEA: 0
COLOR CHANGE: 0
PALPITATIONS: 0

## 2022-09-08 NOTE — PROGRESS NOTES
Alo Merino is a 51 year old, presenting for the following health issues:  Back Pain (Pt complains of mid to lower back pain for the past couple of days. No known injury. )    HPI   Back Pain  Onset/Duration: 2days  Description:   Location of pain: low back, bilateral  Character of pain: dull ache  Pain radiation: none  New numbness or weakness in legs, not attributed to pain: No weakness, numbness, tingling.  No bladder or bowel dysfunction.  No swelling, redness, drainage or fevers.  No dysuria, urinary frequency, urgency or hematuria.  No abdominal pain, n/v, constipation, diarrhea, bloody or black tarry stools.  No fever, chills or sweats.  Intensity: mild  Progression of Symptoms: same  History:   Specific cause: none but he does run a lot, lifts weights and works out alot  Pain interferes with job: no  History of back problems: no prior back problems  Any previous MRI or X-rays: None  Sees a specialist for back pain: No  Alleviating factors:   Improved by: rest    Precipitating factors:  Worsened by: Lifting, Bending, Standing, Sitting, Lying Flat, Walking and Coughing  Therapies tried and outcome: heat, rest and sitting with minimal relief  Accompanying Signs & Symptoms:  Risk of Fracture: None  Risk of Cauda Equina: None  Risk of Infection: None  Risk of Cancer: None  Risk of Ankylosing Spondylitis: Onset at age <35, male, AND morning back stiffness  no     Patient Active Problem List   Diagnosis     Epigastric pain     Dyspepsia     Elevated blood pressure reading without diagnosis of hypertension     Overweight with body mass index (BMI) of 26 to 26.9 in adult     Right bundle branch block     Current Outpatient Medications   Medication     hydrOXYzine (ATARAX) 25 MG tablet     omeprazole (PRILOSEC) 40 MG DR capsule     No current facility-administered medications for this visit.      No Known Allergies  Review of Systems   Constitutional: Negative.  Negative for chills, fatigue and fever.    Respiratory: Negative for cough, chest tightness, shortness of breath and wheezing.    Cardiovascular: Negative for chest pain, palpitations and peripheral edema.   Gastrointestinal: Negative for abdominal pain, nausea and vomiting.   Genitourinary: Negative for dysuria, hematuria and testicular pain.   Musculoskeletal: Positive for arthralgias, back pain and myalgias. Negative for gait problem, joint swelling, neck pain and neck stiffness.   Skin: Negative.  Negative for color change, pallor, rash and wound.   All other systems reviewed and are negative.           Objective    /80 (BP Location: Left arm, Patient Position: Sitting, Cuff Size: Adult Regular)   Pulse 81   Temp 97.1  F (36.2  C) (Tympanic)   Wt 98 kg (216 lb)   SpO2 96%   BMI 27.72 kg/m    Body mass index is 27.72 kg/m .  Physical Exam  Vitals and nursing note reviewed.   Constitutional:       General: He is not in acute distress.     Appearance: Normal appearance. He is well-developed and normal weight. He is not ill-appearing.   Abdominal:      General: Abdomen is flat. Bowel sounds are normal.      Palpations: Abdomen is soft. There is no mass.      Tenderness: There is no abdominal tenderness. There is no right CVA tenderness, left CVA tenderness, guarding or rebound. Negative signs include Nuñez's sign, Rovsing's sign and McBurney's sign.      Hernia: No hernia is present.   Musculoskeletal:      Lumbar back: Spasms and tenderness present. No swelling, edema, deformity, lacerations or bony tenderness. Normal range of motion. Negative right straight leg raise test and negative left straight leg raise test. No scoliosis.   Skin:     General: Skin is warm and dry.      Capillary Refill: Capillary refill takes less than 2 seconds.   Neurological:      Mental Status: He is alert and oriented to person, place, and time.      Sensory: Sensation is intact. No sensory deficit.      Motor: Motor function is intact.      Gait: Gait is intact.  Gait normal.      Deep Tendon Reflexes: Reflexes are normal and symmetric.   Psychiatric:         Mood and Affect: Mood normal.         Behavior: Behavior normal.         Thought Content: Thought content normal.         Judgment: Judgment normal.            Assessment/Plan:  Acute bilateral low back pain without sciatica:  No trauma/injury.  No radicular pain.  Most likely strain/sprain/spasm.  Recommend RICE and will give [id number] and methocarbamol prn pain.  Will avoid NSAIDs due to his IBS.  Discussed risks and benefits of medication along with side effects, direction for use.  No driving or operating machinery due to sedation.  Will send to orthopedics for if no improvement.  Recheck in clinic if symptoms worsen or if symptoms do not improve.   -     methocarbamol (ROBAXIN) 500 MG tablet; Take 1 tablet (500 mg) by mouth 4 times daily as needed for muscle spasms  -     predniSONE (DELTASONE) 20 MG tablet; Take 1 tablet (20 mg) by mouth 2 times daily for 7 days        Liz See ROBYN Crawford

## 2022-10-23 ENCOUNTER — HEALTH MAINTENANCE LETTER (OUTPATIENT)
Age: 52
End: 2022-10-23

## 2023-04-02 ENCOUNTER — HEALTH MAINTENANCE LETTER (OUTPATIENT)
Age: 53
End: 2023-04-02

## 2024-06-02 ENCOUNTER — HEALTH MAINTENANCE LETTER (OUTPATIENT)
Age: 54
End: 2024-06-02

## 2025-06-11 ENCOUNTER — OFFICE VISIT (OUTPATIENT)
Dept: URGENT CARE | Facility: URGENT CARE | Age: 55
End: 2025-06-11
Payer: COMMERCIAL

## 2025-06-11 VITALS
WEIGHT: 219 LBS | TEMPERATURE: 97.4 F | DIASTOLIC BLOOD PRESSURE: 83 MMHG | HEART RATE: 75 BPM | SYSTOLIC BLOOD PRESSURE: 144 MMHG | OXYGEN SATURATION: 95 % | RESPIRATION RATE: 16 BRPM | HEIGHT: 74 IN | BODY MASS INDEX: 28.11 KG/M2

## 2025-06-11 DIAGNOSIS — J06.9 VIRAL URI WITH COUGH: Primary | ICD-10-CM

## 2025-06-11 PROCEDURE — 1126F AMNT PAIN NOTED NONE PRSNT: CPT | Performed by: PHYSICIAN ASSISTANT

## 2025-06-11 PROCEDURE — 3077F SYST BP >= 140 MM HG: CPT | Performed by: PHYSICIAN ASSISTANT

## 2025-06-11 PROCEDURE — 3079F DIAST BP 80-89 MM HG: CPT | Performed by: PHYSICIAN ASSISTANT

## 2025-06-11 PROCEDURE — 99213 OFFICE O/P EST LOW 20 MIN: CPT | Performed by: PHYSICIAN ASSISTANT

## 2025-06-11 ASSESSMENT — PAIN SCALES - GENERAL: PAINLEVEL_OUTOF10: NO PAIN (0)

## 2025-06-11 NOTE — PROGRESS NOTES
Urgent Care Clinic Visit    Chief Complaint   Patient presents with    Pharyngitis     Patient reports sore throat beginning this weekend that is improving. Denies pain at this time. No known exposure; no fevers.     Cough     Cough beginning this past weekend               6/11/2025     2:09 PM   Additional Questions   Roomed by LOTTIE Rosa   Accompanied by Self         6/11/2025   Forms   Any forms needing to be completed Yes     No  Does the patient have a sore throat and either history of fever >100.4 in the previous 24 hours without a cough or recent exposure to a known case of strep throat? No      Moe Pereira LPN

## 2025-06-11 NOTE — PROGRESS NOTES
[facility] U[facility] [address] [address]  Phone: [phone]    Patient:  Wilber Hamm, Date of birth 1970  Date of Visit:  06/11/2025  Referring Provider No ref. provider found    Patient presents with:  Pharyngitis: Patient reports sore throat beginning this weekend that is improving. Denies pain at this time. No known exposure; no fevers.   Cough: Cough beginning this past weekend         ICD-10-CM    1. Viral URI with cough  J06.9           There are no Patient Instructions on file for this visit.    Assessment & Plan      Assessment  - Condition is most likely viral in nature, possibly caused by rhinovirus or another common virus such as parainfluenza virus.  - Unlikely to be strep throat, as symptoms and examination do not strongly suggest it.    Plan  - Offer COVID test if desired,declined today  - Offer strep test if desired for confirmation, but patient is reassured by my lack of suspicion .  - Recommend wearing a surgical mask around high-risk individuals to prevent transmission  - Suggest using over-the-counter cough relievers or prescription Tessalon Perles for comfort if needed. Patient declines Rx.  - Advise hydration, rest, well-rounded meals, and frequent sanitization  - Recommend drinking water or soothing tea, such as lemon and honey tea  - Plan follow-up if symptoms persist for 5 to 7 more days           History of Present Illness     Pertinent history obtain from: patient    Wilber Hamm, a 54-year-old male, began feeling unwell over the past weekend. On Saturday, he felt slightly out of sorts, but by Sunday, he felt fine, attributing the initial symptoms to possibly something in the air. On Monday, he experienced a low-grade fever, chills, and a mild headache. He did not check his temperature but felt the fever was slight. The chills persisted throughout Monday but abated by Tuesday. On Tuesday, he developed a severe sore throat, which  improved by Wednesday, although he remained hoarse. He reported some coughing, which caused discomfort in his throat, and noted minimal postnasal drainage with some mucus production. He has had interactions with his teenage son, who had sniffles, and his significant other, who recently had a sore throat. He denied any breathing issues or significant coughing.    Problem List:  2020-08: Epigastric pain  2020-08: Dyspepsia  2020-08: Elevated blood pressure reading without diagnosis of   hypertension  2020-08: Overweight with body mass index (BMI) of 26 to 26.9 in adult  2016-05: Right bundle branch block      Past Medical History:   Diagnosis Date    Gastroesophageal reflux disease        Social History     Tobacco Use    Smoking status: Never     Passive exposure: Never    Smokeless tobacco: Never   Substance Use Topics    Alcohol use: Not Currently       Physical Exam     Physical Exam  Vitals and nursing note reviewed.   Constitutional:       General: He is not in acute distress.     Appearance: Normal appearance. He is not toxic-appearing or diaphoretic.   HENT:      Head: Normocephalic and atraumatic.      Right Ear: Tympanic membrane, ear canal and external ear normal.      Left Ear: Tympanic membrane, ear canal and external ear normal.      Mouth/Throat:      Mouth: Mucous membranes are moist.      Pharynx: No oropharyngeal exudate or posterior oropharyngeal erythema.   Eyes:      Conjunctiva/sclera: Conjunctivae normal.   Cardiovascular:      Rate and Rhythm: Normal rate and regular rhythm.   Pulmonary:      Effort: Pulmonary effort is normal. No respiratory distress.      Breath sounds: Normal breath sounds. No stridor. No wheezing, rhonchi or rales.   Lymphadenopathy:      Cervical: No cervical adenopathy.   Neurological:      Mental Status: He is alert.   Psychiatric:         Mood and Affect: Mood normal.         Behavior: Behavior normal.         Thought Content: Thought content normal.         Judgment:  "Judgment normal.         Vital signs:  BP (!) 144/83 (BP Location: Left arm, Patient Position: Sitting, Cuff Size: Adult Regular)   Pulse 75   Temp 97.4  F (36.3  C) (Tympanic)   Resp 16   Ht 1.88 m (6' 2\")   Wt 99.3 kg (219 lb)   SpO2 95%   BMI 28.12 kg/m               Consent was obtained from the patient to use an AI documentation tool in the creation of  this note       "

## 2025-06-15 ENCOUNTER — HEALTH MAINTENANCE LETTER (OUTPATIENT)
Age: 55
End: 2025-06-15

## (undated) RX ORDER — DIPHENHYDRAMINE HYDROCHLORIDE 50 MG/ML
INJECTION INTRAMUSCULAR; INTRAVENOUS
Status: DISPENSED
Start: 2020-10-23

## (undated) RX ORDER — FENTANYL CITRATE 50 UG/ML
INJECTION, SOLUTION INTRAMUSCULAR; INTRAVENOUS
Status: DISPENSED
Start: 2020-10-23